# Patient Record
Sex: MALE | Race: WHITE | Employment: OTHER | URBAN - METROPOLITAN AREA
[De-identification: names, ages, dates, MRNs, and addresses within clinical notes are randomized per-mention and may not be internally consistent; named-entity substitution may affect disease eponyms.]

---

## 2017-11-22 PROBLEM — I21.19 ACUTE MI, INFEROLATERAL WALL, SUBSEQUENT EPISODE OF CARE (HCC): Status: ACTIVE | Noted: 2017-11-22

## 2017-11-22 PROBLEM — E78.5 DYSLIPIDEMIA: Status: ACTIVE | Noted: 2017-11-22

## 2017-11-22 PROBLEM — Z95.1 S/P CABG (CORONARY ARTERY BYPASS GRAFT): Status: ACTIVE | Noted: 2017-11-22

## 2017-11-22 PROBLEM — Z95.5 S/P CORONARY ARTERY STENT PLACEMENT: Status: ACTIVE | Noted: 2017-11-22

## 2019-02-05 ENCOUNTER — HOSPITAL ENCOUNTER (OUTPATIENT)
Dept: LAB | Age: 74
Discharge: HOME | End: 2019-02-05
Payer: MEDICARE

## 2019-02-05 DIAGNOSIS — I25.119 ATHEROSCLEROSIS OF NATIVE CORONARY ARTERY OF NATIVE HEART WITH ANGINA PECTORIS (HCC): ICD-10-CM

## 2019-02-05 LAB
ALBUMIN SERPL-MCNC: 3.7 G/DL (ref 3.2–4.6)
ALBUMIN/GLOB SERPL: 1.2 {RATIO}
ALP SERPL-CCNC: 65 U/L (ref 50–136)
ALT SERPL-CCNC: 34 U/L (ref 12–65)
AST SERPL-CCNC: 25 U/L (ref 15–37)
BILIRUB DIRECT SERPL-MCNC: 0.2 MG/DL
BILIRUB SERPL-MCNC: 0.7 MG/DL (ref 0.2–1.1)
CHOLEST SERPL-MCNC: 128 MG/DL
GLOBULIN SER CALC-MCNC: 3.2 G/DL
HDLC SERPL-MCNC: 50 MG/DL (ref 40–60)
HDLC SERPL: 2.6 {RATIO}
LDLC SERPL CALC-MCNC: 53.2 MG/DL
LIPID PROFILE,FLP: ABNORMAL
PROT SERPL-MCNC: 6.9 G/DL (ref 6.3–8.2)
TRIGL SERPL-MCNC: 124 MG/DL (ref 35–150)
VLDLC SERPL CALC-MCNC: 24.8 MG/DL (ref 6–23)

## 2019-02-05 PROCEDURE — 80076 HEPATIC FUNCTION PANEL: CPT

## 2019-02-05 PROCEDURE — 80061 LIPID PANEL: CPT

## 2019-02-05 PROCEDURE — 36415 COLL VENOUS BLD VENIPUNCTURE: CPT

## 2019-08-28 PROBLEM — R07.89 ATYPICAL CHEST PAIN: Status: ACTIVE | Noted: 2019-08-28

## 2019-08-28 PROBLEM — R06.09 DOE (DYSPNEA ON EXERTION): Status: ACTIVE | Noted: 2019-08-28

## 2022-03-18 PROBLEM — I21.19 ACUTE MI, INFEROLATERAL WALL, SUBSEQUENT EPISODE OF CARE (HCC): Status: ACTIVE | Noted: 2017-11-22

## 2022-03-18 PROBLEM — R07.89 ATYPICAL CHEST PAIN: Status: ACTIVE | Noted: 2019-08-28

## 2022-03-18 PROBLEM — R06.09 DOE (DYSPNEA ON EXERTION): Status: ACTIVE | Noted: 2019-08-28

## 2022-03-19 PROBLEM — Z95.5 S/P CORONARY ARTERY STENT PLACEMENT: Status: ACTIVE | Noted: 2017-11-22

## 2022-03-19 PROBLEM — Z95.1 S/P CABG (CORONARY ARTERY BYPASS GRAFT): Status: ACTIVE | Noted: 2017-11-22

## 2022-03-19 PROBLEM — E78.5 DYSLIPIDEMIA: Status: ACTIVE | Noted: 2017-11-22

## 2022-11-30 ENCOUNTER — OFFICE VISIT (OUTPATIENT)
Dept: INTERNAL MEDICINE CLINIC | Facility: CLINIC | Age: 77
End: 2022-11-30
Payer: MEDICARE

## 2022-11-30 VITALS
SYSTOLIC BLOOD PRESSURE: 146 MMHG | HEART RATE: 67 BPM | OXYGEN SATURATION: 97 % | HEIGHT: 72 IN | TEMPERATURE: 98.1 F | BODY MASS INDEX: 29.66 KG/M2 | WEIGHT: 219 LBS | DIASTOLIC BLOOD PRESSURE: 68 MMHG

## 2022-11-30 DIAGNOSIS — F03.B2 MODERATE DEMENTIA WITH PSYCHOTIC DISTURBANCE, UNSPECIFIED DEMENTIA TYPE: ICD-10-CM

## 2022-11-30 DIAGNOSIS — R25.1 TREMOR: ICD-10-CM

## 2022-11-30 DIAGNOSIS — Z95.1 S/P CABG (CORONARY ARTERY BYPASS GRAFT): ICD-10-CM

## 2022-11-30 DIAGNOSIS — I10 ESSENTIAL HYPERTENSION, BENIGN: ICD-10-CM

## 2022-11-30 DIAGNOSIS — N40.1 BENIGN PROSTATIC HYPERPLASIA WITH LOWER URINARY TRACT SYMPTOMS, SYMPTOM DETAILS UNSPECIFIED: ICD-10-CM

## 2022-11-30 DIAGNOSIS — R68.89 OTHER GENERAL SYMPTOMS AND SIGNS: ICD-10-CM

## 2022-11-30 DIAGNOSIS — Z91.81 AT HIGH RISK FOR FALLS: ICD-10-CM

## 2022-11-30 DIAGNOSIS — I50.22 CHRONIC SYSTOLIC (CONGESTIVE) HEART FAILURE (HCC): ICD-10-CM

## 2022-11-30 DIAGNOSIS — G62.89 OTHER POLYNEUROPATHY: ICD-10-CM

## 2022-11-30 DIAGNOSIS — R53.82 CHRONIC FATIGUE: ICD-10-CM

## 2022-11-30 DIAGNOSIS — R73.03 PREDIABETES: ICD-10-CM

## 2022-11-30 DIAGNOSIS — I50.22 CHRONIC SYSTOLIC HF (HEART FAILURE) (HCC): ICD-10-CM

## 2022-11-30 DIAGNOSIS — E78.5 DYSLIPIDEMIA: ICD-10-CM

## 2022-11-30 DIAGNOSIS — G60.9 HEREDITARY AND IDIOPATHIC PERIPHERAL NEUROPATHY: Primary | ICD-10-CM

## 2022-11-30 PROBLEM — R06.09 DOE (DYSPNEA ON EXERTION): Status: RESOLVED | Noted: 2019-08-28 | Resolved: 2022-11-30

## 2022-11-30 PROBLEM — R07.89 ATYPICAL CHEST PAIN: Status: RESOLVED | Noted: 2019-08-28 | Resolved: 2022-11-30

## 2022-11-30 PROBLEM — Z95.5 S/P CORONARY ARTERY STENT PLACEMENT: Status: RESOLVED | Noted: 2017-11-22 | Resolved: 2022-11-30

## 2022-11-30 LAB
ALBUMIN SERPL-MCNC: 4.2 G/DL (ref 3.2–4.6)
ALBUMIN/GLOB SERPL: 1.6 {RATIO} (ref 0.4–1.6)
ALP SERPL-CCNC: 69 U/L (ref 50–136)
ALT SERPL-CCNC: 33 U/L (ref 12–65)
ANION GAP SERPL CALC-SCNC: 6 MMOL/L (ref 2–11)
AST SERPL-CCNC: 12 U/L (ref 15–37)
BASOPHILS # BLD: 0 K/UL (ref 0–0.2)
BASOPHILS NFR BLD: 0 % (ref 0–2)
BILIRUB SERPL-MCNC: 0.7 MG/DL (ref 0.2–1.1)
BUN SERPL-MCNC: 12 MG/DL (ref 8–23)
CALCIUM SERPL-MCNC: 10 MG/DL (ref 8.3–10.4)
CHLORIDE SERPL-SCNC: 106 MMOL/L (ref 101–110)
CHOLEST SERPL-MCNC: 116 MG/DL
CO2 SERPL-SCNC: 26 MMOL/L (ref 21–32)
CREAT SERPL-MCNC: 1 MG/DL (ref 0.8–1.5)
DIFFERENTIAL METHOD BLD: ABNORMAL
EOSINOPHIL # BLD: 0.1 K/UL (ref 0–0.8)
EOSINOPHIL NFR BLD: 1 % (ref 0.5–7.8)
ERYTHROCYTE [DISTWIDTH] IN BLOOD BY AUTOMATED COUNT: 12.2 % (ref 11.9–14.6)
EST. AVERAGE GLUCOSE BLD GHB EST-MCNC: 166 MG/DL
GLOBULIN SER CALC-MCNC: 2.6 G/DL (ref 2.8–4.5)
GLUCOSE SERPL-MCNC: 148 MG/DL (ref 65–100)
HBA1C MFR BLD: 7.4 % (ref 4.8–5.6)
HCT VFR BLD AUTO: 48.5 % (ref 41.1–50.3)
HDLC SERPL-MCNC: 54 MG/DL (ref 40–60)
HDLC SERPL: 2.1 {RATIO}
HGB BLD-MCNC: 15.5 G/DL (ref 13.6–17.2)
IMM GRANULOCYTES # BLD AUTO: 0 K/UL (ref 0–0.5)
IMM GRANULOCYTES NFR BLD AUTO: 0 % (ref 0–5)
LDLC SERPL CALC-MCNC: 42.8 MG/DL
LYMPHOCYTES # BLD: 1.9 K/UL (ref 0.5–4.6)
LYMPHOCYTES NFR BLD: 22 % (ref 13–44)
MCH RBC QN AUTO: 33.3 PG (ref 26.1–32.9)
MCHC RBC AUTO-ENTMCNC: 32 G/DL (ref 31.4–35)
MCV RBC AUTO: 104.1 FL (ref 82–102)
MONOCYTES # BLD: 0.9 K/UL (ref 0.1–1.3)
MONOCYTES NFR BLD: 10 % (ref 4–12)
NEUTS SEG # BLD: 5.7 K/UL (ref 1.7–8.2)
NEUTS SEG NFR BLD: 67 % (ref 43–78)
NRBC # BLD: 0 K/UL (ref 0–0.2)
PLATELET # BLD AUTO: 235 K/UL (ref 150–450)
PMV BLD AUTO: 11.6 FL (ref 9.4–12.3)
POTASSIUM SERPL-SCNC: 4.4 MMOL/L (ref 3.5–5.1)
PROT SERPL-MCNC: 6.8 G/DL (ref 6.3–8.2)
RBC # BLD AUTO: 4.66 M/UL (ref 4.23–5.6)
SODIUM SERPL-SCNC: 138 MMOL/L (ref 133–143)
TRIGL SERPL-MCNC: 96 MG/DL (ref 35–150)
TSH, 3RD GENERATION: 1.65 UIU/ML (ref 0.36–3.74)
VIT B12 SERPL-MCNC: 277 PG/ML (ref 193–986)
VLDLC SERPL CALC-MCNC: 19.2 MG/DL (ref 6–23)
WBC # BLD AUTO: 8.7 K/UL (ref 4.3–11.1)

## 2022-11-30 PROCEDURE — G8484 FLU IMMUNIZE NO ADMIN: HCPCS | Performed by: INTERNAL MEDICINE

## 2022-11-30 PROCEDURE — 1036F TOBACCO NON-USER: CPT | Performed by: INTERNAL MEDICINE

## 2022-11-30 PROCEDURE — 1123F ACP DISCUSS/DSCN MKR DOCD: CPT | Performed by: INTERNAL MEDICINE

## 2022-11-30 PROCEDURE — 3078F DIAST BP <80 MM HG: CPT | Performed by: INTERNAL MEDICINE

## 2022-11-30 PROCEDURE — 99215 OFFICE O/P EST HI 40 MIN: CPT | Performed by: INTERNAL MEDICINE

## 2022-11-30 PROCEDURE — G8427 DOCREV CUR MEDS BY ELIG CLIN: HCPCS | Performed by: INTERNAL MEDICINE

## 2022-11-30 PROCEDURE — G8417 CALC BMI ABV UP PARAM F/U: HCPCS | Performed by: INTERNAL MEDICINE

## 2022-11-30 PROCEDURE — 3075F SYST BP GE 130 - 139MM HG: CPT | Performed by: INTERNAL MEDICINE

## 2022-11-30 RX ORDER — ZINC GLUCONATE 50 MG
50 TABLET ORAL DAILY
COMMUNITY

## 2022-11-30 RX ORDER — MIRTAZAPINE 7.5 MG/1
7.5 TABLET, FILM COATED ORAL NIGHTLY
Qty: 30 TABLET | Refills: 5 | Status: SHIPPED | OUTPATIENT
Start: 2022-11-30

## 2022-11-30 RX ORDER — ASCORBIC ACID 500 MG
1000 TABLET ORAL DAILY
COMMUNITY

## 2022-11-30 SDOH — ECONOMIC STABILITY: FOOD INSECURITY: WITHIN THE PAST 12 MONTHS, YOU WORRIED THAT YOUR FOOD WOULD RUN OUT BEFORE YOU GOT MONEY TO BUY MORE.: NEVER TRUE

## 2022-11-30 SDOH — ECONOMIC STABILITY: FOOD INSECURITY: WITHIN THE PAST 12 MONTHS, THE FOOD YOU BOUGHT JUST DIDN'T LAST AND YOU DIDN'T HAVE MONEY TO GET MORE.: NEVER TRUE

## 2022-11-30 ASSESSMENT — PATIENT HEALTH QUESTIONNAIRE - PHQ9
SUM OF ALL RESPONSES TO PHQ QUESTIONS 1-9: 0
SUM OF ALL RESPONSES TO PHQ9 QUESTIONS 1 & 2: 0
SUM OF ALL RESPONSES TO PHQ QUESTIONS 1-9: 0
2. FEELING DOWN, DEPRESSED OR HOPELESS: 0
1. LITTLE INTEREST OR PLEASURE IN DOING THINGS: 0

## 2022-11-30 ASSESSMENT — SOCIAL DETERMINANTS OF HEALTH (SDOH): HOW HARD IS IT FOR YOU TO PAY FOR THE VERY BASICS LIKE FOOD, HOUSING, MEDICAL CARE, AND HEATING?: NOT HARD AT ALL

## 2022-11-30 NOTE — PROGRESS NOTES
11/30/2022 1:26 PM  Location:[facility] [address] [facility]  [place]  Patient #:  [id number]  YOB: 1945          YOUR LAST HEMOGLOBIN A1CS:   No results found for: HBA1C, [id number]    YOUR LAST LIPID PROFILE:   Lab Results   Component Value Date/Time    CHOL 154 04/12/2021 11:45 AM    HDL 51 04/12/2021 11:45 AM    VLDL 21 04/12/2021 11:45 AM         Lab Results   Component Value Date/Time    GFRAA 85 04/12/2021 11:45 AM    BUN 15 04/12/2021 11:45 AM     04/12/2021 11:45 AM    K 4.6 04/12/2021 11:45 AM     04/12/2021 11:45 AM    CO2 18 04/12/2021 11:45 AM           History of Present Illness     Chief Complaint   Patient presents with    Hallucinations     Started June of 2021 and is still happening multiple times a day. Includes visual, auditory and olfactory hallucinations. Constipation     Struggles with constipation but also diarrhea at times too. Dementia     Having cognitive issues including short term memory loss and loss of autonomy. Insomnia     Restless and staying up all day and night. Pt's daughter says it is contributing to the problem. Tremors     Shaking in hands that prevents him from being able to eat or drink. Patient is brought in by his daughter today. The patient has not been seen in the office in 18 months. He has resisted coming to be evaluated for any reason. He is also not been followed by cardiology for his history of coronary artery disease over the past week this patient has had progressive worsening of his pre-existing symptoms of memory loss. Over the past week he has had what the family describes as chills and shaking worse than usual had worsening hallucinations including seeing animals that are not existent in the yard auditory described as screaming and sensations in his extremities as well. Provided a multipage record of his problems over the past 7 years.   This week he has been unable to even feed himself. Over the past year his mobility is worsened he is sleeping very little which is causing a great deal of distress with the family who are caring for him. He is becoming more combative in conversations. Weight loss has been noted over the past few years with his decreased appetite. Has tremor of the right hand is worsened. Continue to be on his present medications as listed. He especially is having increased tremor of the right hand and shuffling with his gait. He has no prior history of any drug or alcohol use. He has a long history of what sounds like IBS going back and forth between constipation and diarrhea. During this past week they have noticed that he is diaphoretic at times. He does have some cough and congestion with yellowish sputum noted at times. Urinary symptoms are also an issue with frequency.   Please refer to the extensive timeline of events over the past 7 years submitted by the daughter    Mr. Willard Hill is a 68 y.o. male  who presents for office visit      No Known Allergies  Past Medical History:   Diagnosis Date    Benign neoplasm of colon     Body mass index 32.0-32.9, adult 4/15/2015    Coronary atherosclerosis of unspecified type of vessel, native or graft 4/15/2015    Cough     Encounter for long-term (current) use of other medications     Essential hypertension, benign 4/15/2015    Impotence of organic origin     Inguinal hernia without mention of obstruction or gangrene, bilateral, (not specified as recurrent)     Injury to other specified cranial nerves     Malignant melanoma of skin of other and unspecified parts of face     S/P MOH's    Onychomycosis     Other abnormal glucose 4/15/2015    Pre-Diabetes    Palpitations     Plantar fascial fibromatosis     S/P coronary artery stent placement 11/22/2017    Sciatica     Unspecified hereditary and idiopathic peripheral neuropathy 4/15/2015     Social History     Socioeconomic History    Marital status:      Spouse name: None    Number of children: None    Years of education: None    Highest education level: None   Tobacco Use    Smoking status: Never    Smokeless tobacco: Never   Substance and Sexual Activity    Alcohol use: No    Drug use: No     Social Determinants of Health     Financial Resource Strain: Low Risk     Difficulty of Paying Living Expenses: Not hard at all   Food Insecurity: No Food Insecurity    Worried About Running Out of Food in the Last Year: Never true    Ran Out of Food in the Last Year: Never true     Past Surgical History:   Procedure Laterality Date    ANGIOPLASTY  05/22/2007    Coronary    CHOLECYSTECTOMY  09/16/2009    [facility]    CORONARY ARTERY BYPASS GRAFT  05/18/2006    VASECTOMY  05/19/1976     Current Outpatient Medications   Medication Sig Dispense Refill    vitamin C (ASCORBIC ACID) 500 MG tablet Take 1,000 mg by mouth daily      Cholecalciferol (VITAMIN D3) 125 MCG (5000 UT) TABS Take by mouth      zinc 50 MG TABS tablet Take 50 mg by mouth daily      Elderberry 500 MG CAPS Take 1,250 mg by mouth      OIL OF OREGANO PO Take 150 mg by mouth      Coenzyme Q10 (CO Q 10) 100 MG CAPS Take by mouth      Fexofenadine HCl (MUCINEX ALLERGY PO) Take by mouth      Chlorpheniramine Maleate (CHLOR-TABLETS PO) Take by mouth      mirtazapine (REMERON) 7.5 MG tablet Take 1 tablet by mouth nightly 30 tablet 5    aspirin 81 MG EC tablet Take 81 mg by mouth daily      carvedilol (COREG) 6.25 MG tablet TAKE 1 TABLET BY MOUTH TWO TIMES DAILY (WITH MEALS) FOR HIGH BLOOD PRESSURE      lisinopril (PRINIVIL;ZESTRIL) 10 MG tablet TAKE 1 TABLET TWICE DAILY FOR HIGH BLOOD PRESSURE      simvastatin (ZOCOR) 10 MG tablet TAKE 1 TABLET EVERY NIGHT      nitroGLYCERIN (NITROSTAT) 0.4 MG SL tablet Place 0.4 mg under the tongue (Patient not taking: Reported on 11/30/2022)       No current facility-administered medications for this visit.      Health Maintenance   Topic Date Due    COVID-19 Vaccine (1) Never done    Hepatitis C screen  Never done    Shingles vaccine (2 of 3) 11/24/2014    Lipids  04/12/2022    Depression Screen  04/12/2022    DTaP/Tdap/Td vaccine (2 - Td or Tdap) 05/03/2022    Annual Wellness Visit (AWV)  05/23/2022    Flu vaccine (1) 08/01/2022    Pneumococcal 65+ years Vaccine  Completed    Hepatitis A vaccine  Aged Out    Hib vaccine  Aged Out    Meningococcal (ACWY) vaccine  Aged Out     Family History   Problem Relation Age of Onset    Cancer Father         Throat    Heart Attack Father         Before age 61    Hypertension Father     Colon Cancer Brother     Glaucoma Maternal Grandmother     Hypertension Mother     Diabetes Mother     Heart Disease Father              Review of Systems  Review of Systems    BP (!) 146/68 (Site: Left Upper Arm, Position: Sitting, Cuff Size: Medium Adult)   Pulse 67   Temp 98.1 °F (36.7 °C) (Temporal)   Ht 6' (1.829 m)   Wt 219 lb (99.3 kg)   SpO2 97%   BMI 29.70 kg/m²       Physical Exam    Physical Exam  Constitutional:       General: He is not in acute distress. Appearance: He is not ill-appearing. Comments: Weakened in appearance with obvious slow rhythmic right hand and forearm tremor   HENT:      Head: Normocephalic and atraumatic. Right Ear: Tympanic membrane and ear canal normal.      Left Ear: Tympanic membrane and ear canal normal.      Nose: Nose normal.      Mouth/Throat:      Mouth: Mucous membranes are dry. Pharynx: Oropharynx is clear. Eyes:      Extraocular Movements: Extraocular movements intact. Conjunctiva/sclera: Conjunctivae normal.      Pupils: Pupils are equal, round, and reactive to light. Cardiovascular:      Rate and Rhythm: Normal rate and regular rhythm. Pulses: Normal pulses. Heart sounds: Normal heart sounds. Pulmonary:      Effort: Pulmonary effort is normal.      Breath sounds: Normal breath sounds.    Abdominal:      General: Abdomen is flat. Bowel sounds are normal. There is no distension. Palpations: Abdomen is soft. There is no mass. Tenderness: There is no abdominal tenderness. There is no guarding or rebound. Hernia: No hernia is present. Musculoskeletal:         General: Normal range of motion. Legs:       Comments: Bilateral venous stasis changes from knees down   Skin:     General: Skin is warm. Neurological:      General: No focal deficit present. Mental Status: He is alert and oriented to person, place, and time. Cranial Nerves: No cranial nerve deficit, dysarthria or facial asymmetry. Sensory: Sensory deficit present. Motor: Weakness present. Gait: Gait abnormal.      Deep Tendon Reflexes:      Reflex Scores:       Bicep reflexes are 0 on the right side and 0 on the left side. Patellar reflexes are 0 on the right side and 0 on the left side. Comments: Slow movement no obvious rigidity  Absent sensation to light touch and vibration in both feet   Psychiatric:         Mood and Affect: Mood normal.         Behavior: Behavior normal.         Thought Content: Thought content normal.         Judgment: Judgment normal.       Assessment & Plan    Encounter Diagnoses   Name Primary?     Hereditary and idiopathic peripheral neuropathy Yes    Tremor     At high risk for falls     Chronic systolic (congestive) heart failure (HCC)     Chronic fatigue     Other polyneuropathy     Other general symptoms and signs     Essential hypertension, benign     Prediabetes     S/P CABG (coronary artery bypass graft)     Benign prostatic hyperplasia with lower urinary tract symptoms, symptom details unspecified     Chronic systolic HF (heart failure) (HCC)     Dyslipidemia     Moderate dementia with psychotic disturbance, unspecified dementia type        Current Outpatient Medications   Medication Sig Dispense Refill    vitamin C (ASCORBIC ACID) 500 MG tablet Take 1,000 mg by mouth daily Cholecalciferol (VITAMIN D3) 125 MCG (5000 UT) TABS Take by mouth      zinc 50 MG TABS tablet Take 50 mg by mouth daily      Elderberry 500 MG CAPS Take 1,250 mg by mouth      OIL OF OREGANO PO Take 150 mg by mouth      Coenzyme Q10 (CO Q 10) 100 MG CAPS Take by mouth      Fexofenadine HCl (MUCINEX ALLERGY PO) Take by mouth      Chlorpheniramine Maleate (CHLOR-TABLETS PO) Take by mouth      mirtazapine (REMERON) 7.5 MG tablet Take 1 tablet by mouth nightly 30 tablet 5    aspirin 81 MG EC tablet Take 81 mg by mouth daily      carvedilol (COREG) 6.25 MG tablet TAKE 1 TABLET BY MOUTH TWO TIMES DAILY (WITH MEALS) FOR HIGH BLOOD PRESSURE      lisinopril (PRINIVIL;ZESTRIL) 10 MG tablet TAKE 1 TABLET TWICE DAILY FOR HIGH BLOOD PRESSURE      simvastatin (ZOCOR) 10 MG tablet TAKE 1 TABLET EVERY NIGHT      nitroGLYCERIN (NITROSTAT) 0.4 MG SL tablet Place 0.4 mg under the tongue (Patient not taking: Reported on 11/30/2022)       No current facility-administered medications for this visit. Orders Placed This Encounter   Procedures    Culture, Urine     Standing Status:   Future     Standing Expiration Date:   11/30/2023     Order Specific Question:   Specify (ex-cath, midstream, cysto, etc)?      Answer:   midstream    Comprehensive Metabolic Panel     Standing Status:   Future     Number of Occurrences:   1     Standing Expiration Date:   11/30/2023    CBC with Auto Differential     Standing Status:   Future     Number of Occurrences:   1     Standing Expiration Date:   11/30/2023    Lipid Panel     Standing Status:   Future     Number of Occurrences:   1     Standing Expiration Date:   11/30/2023    PSA, Diagnostic     Standing Status:   Future     Number of Occurrences:   1     Standing Expiration Date:   11/30/2023    Vitamin B12     Standing Status:   Future     Number of Occurrences:   1     Standing Expiration Date:   11/30/2023    TSH     Standing Status:   Future     Number of Occurrences:   1     Standing Expiration Date:   11/30/2023    Hemoglobin A1C     Standing Status:   Future     Number of Occurrences:   1     Standing Expiration Date:   11/30/2023    Urinalysis     Standing Status:   Future     Standing Expiration Date:   12/30/2022       There are no discontinued medications. 1. Tremor  Mostly in the right hand rhythmic consistent with a Parkinson's-like tremor. This is in association with his slight masked appearance and short gait. 2. Hereditary and idiopathic peripheral neuropathy  Noted for many years unchanged. No obvious etiology noted in the old records    3. At high risk for falls       4. Chronic systolic (congestive) heart failure (HCC)  Seems compensated on exam    5. Chronic fatigue     - Comprehensive Metabolic Panel; Future  - CBC with Auto Differential; Future  - TSH; Future  - TSH  - CBC with Auto Differential  - Comprehensive Metabolic Panel    6. Other polyneuropathy       7. Other general symptoms and signs     - Vitamin B12; Future  - Vitamin B12    8. Essential hypertension, benign  Blood pressure is relatively well controlled  - Lipid Panel; Future  - Lipid Panel    9. Prediabetes  Noted in the past  - Hemoglobin A1C; Future  - Hemoglobin A1C    10. S/P CABG (coronary artery bypass graft)       11. Benign prostatic hyperplasia with lower urinary tract symptoms, symptom details unspecified  Symptoms seem consistent with this he cannot provide a urinalysis today for patient of UTI or otherwise  - PSA, Diagnostic; Future  - Urinalysis; Future  - Culture, Urine; Future  - PSA, Diagnostic    12. Chronic systolic HF (heart failure) (HCC)  Is stable followed by cardiology he is overdue for a visit    13. Dyslipidemia  On a statin    14. Moderate dementia with psychotic disturbance, unspecified dementia type  Today he is oriented his symptoms seem to be worsened by late afternoon associated with agitation.   The daughter is done extensive research on his symptoms and feels that he is suffering from Lewy bodies dementia. I had a lengthy conversation with her in regards to a work-up for dementia and this will be initiated today I do feel he should have a follow-up appointment with neurology as well to help differentiate and parkinsonism with dementia or possible Lewy bodies dementia      No follow-up provider specified. Spent approximately 1 hour with the family reviewing records and counseling and otherwise    Cristela De Luna MD  On the basis of positive falls risk screening, assessment and plan is as follows: home safety tips provided.

## 2022-12-01 DIAGNOSIS — N40.1 BENIGN PROSTATIC HYPERPLASIA WITH LOWER URINARY TRACT SYMPTOMS, SYMPTOM DETAILS UNSPECIFIED: ICD-10-CM

## 2022-12-01 LAB
APPEARANCE UR: CLEAR
BILIRUB UR QL: NEGATIVE
COLOR UR: NORMAL
GLUCOSE UR STRIP.AUTO-MCNC: NEGATIVE MG/DL
HGB UR QL STRIP: NEGATIVE
KETONES UR QL STRIP.AUTO: NEGATIVE MG/DL
LEUKOCYTE ESTERASE UR QL STRIP.AUTO: NEGATIVE
NITRITE UR QL STRIP.AUTO: NEGATIVE
PH UR STRIP: 5 [PH] (ref 5–9)
PROT UR STRIP-MCNC: NEGATIVE MG/DL
PSA SERPL-MCNC: 13.2 NG/ML
SP GR UR REFRACTOMETRY: 1.01 (ref 1–1.02)
UROBILINOGEN UR QL STRIP.AUTO: 0.2 EU/DL (ref 0.2–1)

## 2022-12-04 LAB
BACTERIA SPEC CULT: NORMAL
SERVICE CMNT-IMP: NORMAL

## 2022-12-05 ENCOUNTER — TELEPHONE (OUTPATIENT)
Dept: INTERNAL MEDICINE CLINIC | Facility: CLINIC | Age: 77
End: 2022-12-05

## 2022-12-05 DIAGNOSIS — N40.1 BENIGN PROSTATIC HYPERPLASIA WITH URINARY FREQUENCY: Primary | ICD-10-CM

## 2022-12-05 DIAGNOSIS — R35.0 BENIGN PROSTATIC HYPERPLASIA WITH URINARY FREQUENCY: Primary | ICD-10-CM

## 2022-12-05 DIAGNOSIS — G20 PARKINSON DISEASE (HCC): Primary | ICD-10-CM

## 2022-12-05 DIAGNOSIS — R97.20 ELEVATED PSA: ICD-10-CM

## 2022-12-05 NOTE — TELEPHONE ENCOUNTER
Spoke to the daughter, will start Sinemet at low dose, make appt to see urology due to BPH symptoms and elevated PSA. He is improving on mirtazapine hs for sleep.  The will start oral B12 as well cms

## 2022-12-21 ENCOUNTER — OFFICE VISIT (OUTPATIENT)
Dept: UROLOGY | Age: 77
End: 2022-12-21
Payer: MEDICARE

## 2022-12-21 DIAGNOSIS — R97.20 ELEVATED PSA: Primary | ICD-10-CM

## 2022-12-21 DIAGNOSIS — N40.1 BPH WITH OBSTRUCTION/LOWER URINARY TRACT SYMPTOMS: ICD-10-CM

## 2022-12-21 DIAGNOSIS — N13.8 BPH WITH OBSTRUCTION/LOWER URINARY TRACT SYMPTOMS: ICD-10-CM

## 2022-12-21 LAB
BILIRUBIN, URINE, POC: NEGATIVE
BLOOD URINE, POC: NORMAL
GLUCOSE URINE, POC: NEGATIVE
KETONES, URINE, POC: NEGATIVE
LEUKOCYTE ESTERASE, URINE, POC: NEGATIVE
NITRITE, URINE, POC: NEGATIVE
PH, URINE, POC: 5.5 (ref 4.6–8)
PROTEIN,URINE, POC: NEGATIVE
SPECIFIC GRAVITY, URINE, POC: 1.02 (ref 1–1.03)
URINALYSIS CLARITY, POC: NORMAL
URINALYSIS COLOR, POC: NORMAL
UROBILINOGEN, POC: NORMAL

## 2022-12-21 PROCEDURE — G8484 FLU IMMUNIZE NO ADMIN: HCPCS | Performed by: NURSE PRACTITIONER

## 2022-12-21 PROCEDURE — 99204 OFFICE O/P NEW MOD 45 MIN: CPT | Performed by: NURSE PRACTITIONER

## 2022-12-21 PROCEDURE — 1123F ACP DISCUSS/DSCN MKR DOCD: CPT | Performed by: NURSE PRACTITIONER

## 2022-12-21 PROCEDURE — G8427 DOCREV CUR MEDS BY ELIG CLIN: HCPCS | Performed by: NURSE PRACTITIONER

## 2022-12-21 PROCEDURE — 81003 URINALYSIS AUTO W/O SCOPE: CPT | Performed by: NURSE PRACTITIONER

## 2022-12-21 PROCEDURE — G8417 CALC BMI ABV UP PARAM F/U: HCPCS | Performed by: NURSE PRACTITIONER

## 2022-12-21 PROCEDURE — 1036F TOBACCO NON-USER: CPT | Performed by: NURSE PRACTITIONER

## 2022-12-21 ASSESSMENT — ENCOUNTER SYMPTOMS
RESPIRATORY NEGATIVE: 1
EYES NEGATIVE: 1

## 2022-12-21 NOTE — PROGRESS NOTES
[facility]  [address] [facility]  Nisha Sandoval [address] [facility], [address] [facility]  [phone]          Mike Triplett  : 1945    Chief Complaint   Patient presents with    New Patient     Elevated PSA          HPI     Mike Triplett is a 68 y.o. male referred for elevated PSA. He reports NO h/o BPH or prostate CA. No family h/o urological CA. He has had melanoma and basal cell skin CA. He reports nocturia x 2/night, min PVD, and some urgency. Overall, he has no concern w his voiding. No h/o gross hematuria. Previous vasectomy. Non-smoker. He is accompanied by his wife. Recently started have dementia and hand tremor. ?jazmin. Has been referred to neurology for evaluation.          Past Medical History:   Diagnosis Date    Benign neoplasm of colon     Body mass index 32.0-32.9, adult 4/15/2015    Coronary atherosclerosis of unspecified type of vessel, native or graft 4/15/2015    Cough     Encounter for long-term (current) use of other medications     Essential hypertension, benign 4/15/2015    Impotence of organic origin     Inguinal hernia without mention of obstruction or gangrene, bilateral, (not specified as recurrent)     Injury to other specified cranial nerves     Malignant melanoma of skin of other and unspecified parts of face     S/P MOH's    Onychomycosis     Other abnormal glucose 4/15/2015    Pre-Diabetes    Palpitations     Plantar fascial fibromatosis     S/P coronary artery stent placement 2017    Sciatica     Unspecified hereditary and idiopathic peripheral neuropathy 4/15/2015     Past Surgical History:   Procedure Laterality Date    ANGIOPLASTY  2007    Coronary    CHOLECYSTECTOMY  2009    CORONARY ANGIOPLASTY WITH [address]    CORONARY ARTERY BYPASS GRAFT  2006    VASECTOMY  1976     Current Outpatient Medications   Medication Sig Dispense Refill    carbidopa-levodopa (SINEMET)  MG per tablet Take 1 tablet by mouth 3 times daily 90 tablet 3    vitamin C (ASCORBIC ACID) 500 MG tablet Take 1,000 mg by mouth daily      Cholecalciferol (VITAMIN D3) 125 MCG (5000 UT) TABS Take by mouth      zinc 50 MG TABS tablet Take 50 mg by mouth daily      Elderberry 500 MG CAPS Take 1,250 mg by mouth      OIL OF OREGANO PO Take 150 mg by mouth      Coenzyme Q10 (CO Q 10) 100 MG CAPS Take by mouth      Fexofenadine HCl (MUCINEX ALLERGY PO) Take by mouth      Chlorpheniramine Maleate (CHLOR-TABLETS PO) Take by mouth      mirtazapine (REMERON) 7.5 MG tablet Take 1 tablet by mouth nightly 30 tablet 5    aspirin 81 MG EC tablet Take 81 mg by mouth daily      carvedilol (COREG) 6.25 MG tablet TAKE 1 TABLET BY MOUTH TWO TIMES DAILY (WITH MEALS) FOR HIGH BLOOD PRESSURE      lisinopril (PRINIVIL;ZESTRIL) 10 MG tablet TAKE 1 TABLET TWICE DAILY FOR HIGH BLOOD PRESSURE      simvastatin (ZOCOR) 10 MG tablet TAKE 1 TABLET EVERY NIGHT      nitroGLYCERIN (NITROSTAT) 0.4 MG SL tablet Place 0.4 mg under the tongue (Patient not taking: No sig reported)       No current facility-administered medications for this visit.      No Known Allergies  Social History     Socioeconomic History    Marital status:      Spouse name: Not on file    Number of children: Not on file    Years of education: Not on file    Highest education level: Not on file   Occupational History    Not on file   Tobacco Use    Smoking status: Never    Smokeless tobacco: Never   Substance and Sexual Activity    Alcohol use: No    Drug use: No    Sexual activity: Not on file   Other Topics Concern    Not on file   Social History Narrative    Not on file     Social Determinants of Health     Financial Resource Strain: Low Risk     Difficulty of Paying Living Expenses: Not hard at all   Food Insecurity: No Food Insecurity    Worried About Running Out of Food in the Last Year: Never true    Ran Out of Food in the Last Year: Never true   Transportation Needs: Not on file   Physical Activity: Not on file   Stress: Not on file   Social Connections: Not on file   Intimate Partner Violence: Not on file   Housing Stability: Not on file     Family History   Problem Relation Age of Onset    Cancer Father         Throat    Heart Attack Father         Before age 61    Hypertension Father     Colon Cancer Brother     Glaucoma Maternal Grandmother     Hypertension Mother     Diabetes Mother     Heart Disease Father        Review of Systems  Skin: Negative skin ROS  Eyes: Eyes negative  ENT: HENT negative  Respiratory: Respiratory negative  Cardiovascular: Positive for hypertension, leg pain and leg swelling. Genitourinary: Positive for nocturia, slower stream, urgency, leakage w/ urge and frequent urination. Musculoskeletal: Positive for muscle weakness. Neurological: Positive for focal weakness, numbness and tremors. Psychological: Neg psych ROS  Endocrine: Positive for cold intolerance, fatigue and heat intolerance.     Urinalysis  UA - Dipstick  Results for orders placed or performed in visit on 12/21/22   AMB POC URINALYSIS DIP STICK AUTO W/O MICRO   Result Value Ref Range    Color (UA POC)      Clarity (UA POC)      Glucose, Urine, POC Negative Negative    Bilirubin, Urine, POC Negative Negative    KETONES, Urine, POC Negative Negative    Specific Gravity, Urine, POC 1.025 1.001 - 1.035    Blood (UA POC) Trace Negative    pH, Urine, POC 5.5 4.6 - 8.0    Protein, Urine, POC Negative Negative    Urobilinogen, POC 0.2 mg/dL     Nitrite, Urine, POC Negative Negative    Leukocyte Esterase, Urine, POC Negative Negative       UA - Micro  WBC - 0  RBC - 0  Bacteria - 0  Epith - 0    PHYSICAL EXAM    General appearance - well appearing and in no distress  Mental status - alert, oriented to person, place, and time  Neck - supple, no significant adenopathy  Chest/Lung-  Quiet, even and easy respiratory effort without use of accessory muscles  Rectal/EMANUEL- anodular prostate   Skin - normal coloration and turgor, no rashes      Assessment and Plan    ICD-10-CM    1. Elevated PSA  R97.20 AMB POC URINALYSIS DIP STICK AUTO W/O MICRO     PSA, Diagnostic     PSA, Diagnostic      2. BPH with obstruction/lower urinary tract symptoms  N40.1     N13.8           Elevated PS- We first discussed the physiology of psa. We also discussed potential causes of elevated psa including prostate cancer, inflammation, infection, BPH, and idiopathic elevations. Treatment options including rechecking psa vs. MRI prostate vs. going forward with prostate biopsy were discussed. Due to normal EMANUEL, will recheck PSA today. Follow up pending this. BPH/LUTS- urine normal. LUTS consistent w BPH. No bother to patient. No tx indicated at this time. HOLD on rx today. Advised to call sooner if needed. Lori Waters, DEMETRIOP, APRN - CNP  Dr. Tamica Irwin is supervising physician today and he approves plan of care.

## 2022-12-22 LAB — PSA SERPL-MCNC: 9 NG/ML

## 2023-01-04 RX ORDER — LISINOPRIL 10 MG/1
TABLET ORAL
Qty: 180 TABLET | Refills: 0 | Status: SHIPPED | OUTPATIENT
Start: 2023-01-04

## 2023-01-04 RX ORDER — CARVEDILOL 6.25 MG/1
TABLET ORAL
Qty: 180 TABLET | Refills: 0 | Status: SHIPPED | OUTPATIENT
Start: 2023-01-04

## 2023-01-04 RX ORDER — SIMVASTATIN 10 MG
TABLET ORAL
Qty: 90 TABLET | Refills: 0 | Status: SHIPPED | OUTPATIENT
Start: 2023-01-04

## 2023-01-05 ASSESSMENT — PATIENT HEALTH QUESTIONNAIRE - PHQ9
SUM OF ALL RESPONSES TO PHQ QUESTIONS 1-9: 0
SUM OF ALL RESPONSES TO PHQ QUESTIONS 1-9: 0
2. FEELING DOWN, DEPRESSED OR HOPELESS: 0
SUM OF ALL RESPONSES TO PHQ QUESTIONS 1-9: 0
SUM OF ALL RESPONSES TO PHQ QUESTIONS 1-9: 0
SUM OF ALL RESPONSES TO PHQ9 QUESTIONS 1 & 2: 0
1. LITTLE INTEREST OR PLEASURE IN DOING THINGS: 0

## 2023-01-05 ASSESSMENT — LIFESTYLE VARIABLES
HOW MANY STANDARD DRINKS CONTAINING ALCOHOL DO YOU HAVE ON A TYPICAL DAY: PATIENT DOES NOT DRINK
HOW OFTEN DO YOU HAVE A DRINK CONTAINING ALCOHOL: NEVER

## 2023-01-06 ENCOUNTER — OFFICE VISIT (OUTPATIENT)
Dept: INTERNAL MEDICINE CLINIC | Facility: CLINIC | Age: 78
End: 2023-01-06

## 2023-01-06 VITALS
TEMPERATURE: 97.3 F | DIASTOLIC BLOOD PRESSURE: 69 MMHG | SYSTOLIC BLOOD PRESSURE: 143 MMHG | HEART RATE: 59 BPM | BODY MASS INDEX: 29.99 KG/M2 | WEIGHT: 221.4 LBS | RESPIRATION RATE: 16 BRPM | HEIGHT: 72 IN

## 2023-01-06 DIAGNOSIS — I50.22 CHRONIC SYSTOLIC HF (HEART FAILURE) (HCC): ICD-10-CM

## 2023-01-06 DIAGNOSIS — R41.3 MEMORY LOSS: ICD-10-CM

## 2023-01-06 DIAGNOSIS — G60.9 HEREDITARY AND IDIOPATHIC PERIPHERAL NEUROPATHY: ICD-10-CM

## 2023-01-06 DIAGNOSIS — Z00.00 MEDICARE ANNUAL WELLNESS VISIT, SUBSEQUENT: ICD-10-CM

## 2023-01-06 DIAGNOSIS — N40.1 BENIGN PROSTATIC HYPERPLASIA WITH URINARY FREQUENCY: ICD-10-CM

## 2023-01-06 DIAGNOSIS — R35.0 BENIGN PROSTATIC HYPERPLASIA WITH URINARY FREQUENCY: ICD-10-CM

## 2023-01-06 DIAGNOSIS — G62.89 OTHER POLYNEUROPATHY: ICD-10-CM

## 2023-01-06 DIAGNOSIS — G20 PARKINSON DISEASE (HCC): Primary | ICD-10-CM

## 2023-01-06 DIAGNOSIS — R73.03 PREDIABETES: ICD-10-CM

## 2023-01-06 DIAGNOSIS — F51.01 PRIMARY INSOMNIA: ICD-10-CM

## 2023-01-06 LAB — GLUCOSE, POC: 150 MG/DL

## 2023-01-06 RX ORDER — ALPHA LIPOIC ACID 300 MG
CAPSULE ORAL DAILY
COMMUNITY

## 2023-01-06 RX ORDER — MIRTAZAPINE 7.5 MG/1
7.5-15 TABLET, FILM COATED ORAL NIGHTLY
Qty: 180 TABLET | Refills: 3 | Status: SHIPPED | OUTPATIENT
Start: 2023-01-06

## 2023-01-06 RX ORDER — CARBIDOPA/LEVODOPA 25MG-250MG
1 TABLET ORAL 2 TIMES DAILY
Qty: 60 TABLET | Refills: 3 | Status: SHIPPED | OUTPATIENT
Start: 2023-01-06

## 2023-01-06 RX ORDER — CALCIUM/MAGNESIUM/ZINC 333-133 MG
TABLET ORAL DAILY
COMMUNITY

## 2023-01-06 NOTE — PROGRESS NOTES
1/6/2023 4:41 PM  Location:[facility] [address] [facility]  [place]  Patient #:  [id number]  YOB: 1945          YOUR LAST HEMOGLOBIN A1CS:   No results found for: HBA1C, [id number]    YOUR LAST LIPID PROFILE:   Lab Results   Component Value Date/Time    CHOL 116 11/30/2022 12:27 PM    HDL 54 11/30/2022 12:27 PM    VLDL 21 04/12/2021 11:45 AM         Lab Results   Component Value Date/Time    GFRAA 85 04/12/2021 11:45 AM    BUN 12 11/30/2022 12:27 PM     11/30/2022 12:27 PM    K 4.4 11/30/2022 12:27 PM     11/30/2022 12:27 PM    CO2 26 11/30/2022 12:27 PM           History of Present Illness     Chief Complaint   Patient presents with    Medicare AWV     Subsequent     Follow-up     4 week follow-up    Discuss Labs     Saw the urologist and the PSA has came down to 9 from a 13 and they will recheck again in March     Since patient's last visit he has had a remarkable improvement with his agitation and insomnia with mirtazapine. The daughter is noted no real improvement with his tremor or other symptoms with the low-dose Sinemet. He PSA was over 13 and he is recently seen urology and a repeat PSA was significantly lower down to 9 and they will follow this conservatively. His memory loss continues to be problematic. Blood sugars have been ranging in the 160-180 range and his hemoglobin A1c last visit was 7.4. He is on no treatment for diabetes.     Mr. Esperanza Sher is a 68 y.o. male  who presents for office visit      No Known Allergies  Past Medical History:   Diagnosis Date    Benign neoplasm of colon     Body mass index 32.0-32.9, adult 4/15/2015    Coronary atherosclerosis of unspecified type of vessel, native or graft 4/15/2015    Cough     Encounter for long-term (current) use of other medications     Essential hypertension, benign 4/15/2015    Impotence of organic origin     Inguinal hernia without mention of obstruction or gangrene, bilateral, (not specified as recurrent)     Injury to other specified cranial nerves     Malignant melanoma of skin of other and unspecified parts of face     S/P MOH's    Onychomycosis     Other abnormal glucose 4/15/2015    Pre-Diabetes    Palpitations     Plantar fascial fibromatosis     S/P coronary artery stent placement 11/22/2017    Sciatica     Unspecified hereditary and idiopathic peripheral neuropathy 4/15/2015     Social History     Socioeconomic History    Marital status:      Spouse name: None    Number of children: None    Years of education: None    Highest education level: None   Tobacco Use    Smoking status: Never    Smokeless tobacco: Never   Substance and Sexual Activity    Alcohol use: No    Drug use: No     Social Determinants of Health     Financial Resource Strain: Low Risk     Difficulty of Paying Living Expenses: Not hard at all   Food Insecurity: No Food Insecurity    Worried About Running Out of Food in the Last Year: Never true    Ran Out of Food in the Last Year: Never true   Physical Activity: Insufficiently Active    Days of Exercise per Week: 7 days    Minutes of Exercise per Session: 10 min     Past Surgical History:   Procedure Laterality Date    ANGIOPLASTY  05/22/2007    Coronary    CHOLECYSTECTOMY  09/16/2009    CORONARY ANGIOPLASTY WITH STENT PLACEMENT  1989    Formerly [facility], 1928–1983    CORONARY ARTERY BYPASS GRAFT  05/18/2006    VASECTOMY  05/19/1976     Current Outpatient Medications   Medication Sig Dispense Refill    Alpha-Lipoic Acid 300 MG CAPS Take by mouth daily      Echinacea 400 MG CAPS Take by mouth daily      mirtazapine (REMERON) 7.5 MG tablet Take 1-2 tablets by mouth nightly 180 tablet 3    carbidopa-levodopa (SINEMET)  MG per tablet Take 1 tablet by mouth in the morning and at bedtime 60 tablet 3    lisinopril (PRINIVIL;ZESTRIL) 10 MG tablet TAKE 1 TABLET TWICE DAILY FOR HIGH BLOOD PRESSURE 180 tablet 0    simvastatin (ZOCOR) 10 MG tablet TAKE 1 TABLET EVERY NIGHT 90 tablet 0    carvedilol (COREG) 6.25 MG tablet TAKE 1 TABLET TWICE DAILY WITH MEALS FOR HIGH BLOOD PRESSURE 180 tablet 0    vitamin C (ASCORBIC ACID) 500 MG tablet Take 1,000 mg by mouth daily      Cholecalciferol (VITAMIN D3) 125 MCG (5000 UT) TABS Take by mouth      zinc 50 MG TABS tablet Take 50 mg by mouth daily      Elderberry 500 MG CAPS Take 1,250 mg by mouth daily      OIL OF OREGANO PO Take 50 mg by mouth daily      Coenzyme Q10 (CO Q 10) 100 MG CAPS Take by mouth      Fexofenadine HCl (MUCINEX ALLERGY PO) Take by mouth      Chlorpheniramine Maleate (CHLOR-TABLETS PO) Take by mouth as needed      mirtazapine (REMERON) 7.5 MG tablet Take 1 tablet by mouth nightly 30 tablet 5    aspirin 81 MG EC tablet Take 81 mg by mouth daily      nitroGLYCERIN (NITROSTAT) 0.4 MG SL tablet Place 0.4 mg under the tongue every 5 minutes as needed       No current facility-administered medications for this visit.      Health Maintenance   Topic Date Due    Hepatitis C screen  Never done    Shingles vaccine (2 of 3) 11/24/2014    DTaP/Tdap/Td vaccine (2 - Td or Tdap) 05/03/2022    Flu vaccine (1) 01/06/2024 (Originally 8/1/2022)    COVID-19 Vaccine (1) 01/06/2024 (Originally 1945)    Lipids  11/30/2023    Prostate Specific Antigen (PSA) Screening or Monitoring  12/21/2023    Depression Screen  01/05/2024    Annual Wellness Visit (AWV)  01/07/2024    Pneumococcal 65+ years Vaccine  Completed    Hepatitis A vaccine  Aged Out    Hib vaccine  Aged Out    Meningococcal (ACWY) vaccine  Aged Out     Family History   Problem Relation Age of Onset    Cancer Father         Throat    Heart Attack Father         Before age 61    Hypertension Father     Colon Cancer Brother     Glaucoma Maternal Grandmother     Hypertension Mother     Diabetes Mother     Heart Disease Father              Review of Systems  Review of Systems    BP (!) 143/69 (Site: Left Upper Arm, Position: Sitting, Cuff Size: Large Adult)   Pulse 59   Temp 97.3 °F (36.3 °C) (Temporal)   Resp 16   Ht 6' (1.829 m)   Wt 221 lb 6.4 oz (100.4 kg)   BMI 30.03 kg/m²       Physical Exam    Physical Exam  Constitutional:       General: He is not in acute distress. Appearance: He is not ill-appearing. Comments: Patient alert sitting in chair with coarse right arm and hand tremor   HENT:      Head: Normocephalic and atraumatic. Eyes:      Extraocular Movements: Extraocular movements intact. Pupils: Pupils are equal, round, and reactive to light. Cardiovascular:      Rate and Rhythm: Normal rate and regular rhythm. Pulmonary:      Effort: Pulmonary effort is normal.      Breath sounds: Normal breath sounds. Skin:     General: Skin is warm. Neurological:      Mental Status: He is alert. Motor: No weakness. Psychiatric:         Mood and Affect: Mood normal.         Behavior: Behavior normal.       Assessment & Plan    Encounter Diagnoses   Name Primary?     Parkinson disease ([facility] [address] Yes    Other polyneuropathy     Primary insomnia     Chronic systolic HF (heart failure) (HCC)     Prediabetes     Benign prostatic hyperplasia with urinary frequency     Hereditary and idiopathic peripheral neuropathy     Memory loss     Medicare annual wellness visit, subsequent        Current Outpatient Medications   Medication Sig Dispense Refill    Alpha-Lipoic Acid 300 MG CAPS Take by mouth daily      Echinacea 400 MG CAPS Take by mouth daily      mirtazapine (REMERON) 7.5 MG tablet Take 1-2 tablets by mouth nightly 180 tablet 3    carbidopa-levodopa (SINEMET)  MG per tablet Take 1 tablet by mouth in the morning and at bedtime 60 tablet 3    lisinopril (PRINIVIL;ZESTRIL) 10 MG tablet TAKE 1 TABLET TWICE DAILY FOR HIGH BLOOD PRESSURE 180 tablet 0    simvastatin (ZOCOR) 10 MG tablet TAKE 1 TABLET EVERY NIGHT 90 tablet 0    carvedilol (COREG) 6.25 MG tablet TAKE 1 TABLET TWICE DAILY WITH MEALS FOR HIGH BLOOD PRESSURE 180 tablet 0    vitamin C (ASCORBIC ACID) 500 MG tablet Take 1,000 mg by mouth daily      Cholecalciferol (VITAMIN D3) 125 MCG (5000 UT) TABS Take by mouth      zinc 50 MG TABS tablet Take 50 mg by mouth daily      Elderberry 500 MG CAPS Take 1,250 mg by mouth daily      OIL OF OREGANO PO Take 50 mg by mouth daily      Coenzyme Q10 (CO Q 10) 100 MG CAPS Take by mouth      Fexofenadine HCl (MUCINEX ALLERGY PO) Take by mouth      Chlorpheniramine Maleate (CHLOR-TABLETS PO) Take by mouth as needed      mirtazapine (REMERON) 7.5 MG tablet Take 1 tablet by mouth nightly 30 tablet 5    aspirin 81 MG EC tablet Take 81 mg by mouth daily      nitroGLYCERIN (NITROSTAT) 0.4 MG SL tablet Place 0.4 mg under the tongue every 5 minutes as needed       No current facility-administered medications for this visit. Orders Placed This Encounter   Procedures    Ambulatory referral to Neurology     Referral Priority:   Routine     Referral Type:   Eval and Treat     Referral Reason:   Specialty Services Required     Requested Specialty:   Neurology     Number of Visits Requested:   1    AMB POC GLUCOSE BLOOD, BY GLUCOSE MONITORING DEVICE       Medications Discontinued During This Encounter   Medication Reason    carbidopa-levodopa (SINEMET)  MG per tablet ERROR       1. Other polyneuropathy       2. Parkinson disease ([facility]  Will titrate upward on Sinemet and refer to neurology  - carbidopa-levodopa (SINEMET)  MG per tablet; Take 1 tablet by mouth in the morning and at bedtime  Dispense: 60 tablet; Refill: 3  - Ambulatory referral to Neurology    3. Primary insomnia  Continue mirtazapine which is effective  - mirtazapine (REMERON) 7.5 MG tablet; Take 1-2 tablets by mouth nightly  Dispense: 180 tablet; Refill: 3    4. Chronic systolic HF (heart failure) ([facility]  Compensated followed by cardiology    5. Prediabetes  With his hemoglobin A1c of 7.4 and inability to take metformin due to his recurrent diarrhea we will start Ozempic at low-dose.   He was given 1 dose of 0.25 mg today and instructions given to his daughter for weekly use they will provide blood sugars twice daily over the next 2 weeks  - AMB POC GLUCOSE BLOOD, BY GLUCOSE MONITORING DEVICE    6. Benign prostatic hyperplasia with urinary frequency  Followed by urology with elevated PSA    7. Hereditary and idiopathic peripheral neuropathy       8. Memory loss  Possibly due to parkinsonism consider Lewy bodies dementia. No follow-up provider specified. Lisa Thomas MD  Medicare Annual Wellness Visit    Alejandrina Black is here for Medicare AWV (Subsequent ), Follow-up (4 week follow-up), and Discuss Labs (Saw the urologist and the PSA has came down to 9 from a 13 and they will recheck again in March)    Assessment & Plan   Parkinson disease ([facility] [address]  -     carbidopa-levodopa (SINEMET)  MG per tablet; Take 1 tablet by mouth in the morning and at bedtime, Disp-60 tablet, R-3Normal  -     Ambulatory referral to Neurology  Other polyneuropathy  Primary insomnia  -     mirtazapine (REMERON) 7.5 MG tablet; Take 1-2 tablets by mouth nightly, Disp-180 tablet, [id number]  Chronic systolic HF (heart failure) (HCC)  Prediabetes  -     AMB POC GLUCOSE BLOOD, BY GLUCOSE MONITORING DEVICE  Benign prostatic hyperplasia with urinary frequency  Hereditary and idiopathic peripheral neuropathy  Memory loss  Medicare annual wellness visit, subsequent      Recommendations for Preventive Services Due: see orders and patient instructions/AVS.  Recommended screening schedule for the next 5-10 years is provided to the patient in written form: see Patient Instructions/AVS.     Return in about 6 weeks (around 2/17/2023). Subjective   The following acute and/or chronic problems were also addressed today:   Diagnosis Orders   1. Parkinson disease ([facility] [address]  carbidopa-levodopa (SINEMET)  MG per tablet    Ambulatory referral to Neurology      2. Other polyneuropathy        3.  Primary insomnia  mirtazapine (REMERON) 7.5 MG tablet 4. Chronic systolic HF (heart failure) ([facility] [address]        5. Prediabetes  AMB POC GLUCOSE BLOOD, BY GLUCOSE MONITORING DEVICE      6. Benign prostatic hyperplasia with urinary frequency        7. Hereditary and idiopathic peripheral neuropathy        8. Memory loss        9. Medicare annual wellness visit, subsequent              Patient's complete Health Risk Assessment and screening values have been reviewed and are found in Flowsheets. The following problems were reviewed today and where indicated follow up appointments were made and/or referrals ordered. Positive Risk Factor Screenings with Interventions:    Fall Risk:  Do you feel unsteady or are you worried about falling? : (!) yes  2 or more falls in past year?: (!) yes (neuropathy in his feet.)  Fall with injury in past year?: no     Interventions:    Patient advised to follow-up in this office for further evaluation and treatment    Cognitive: Words recalled: 1 Word Recalled (missed sunrise and chair.)           Total Score Interpretation: Abnormal Mini-Cog      Interventions:  Neurology referral             Weight and Activity:  Physical Activity: Insufficiently Active    Days of Exercise per Week: 7 days    Minutes of Exercise per Session: 10 min     On average, how many days per week do you engage in moderate to strenuous exercise (like a brisk walk)?: 7 days  Have you lost any weight without trying in the past 3 months?: No  Body mass index: (!) 30.02    Obesity Interventions:  Patient comments: discussed low carb diet    Obesity Counseling: Patient was asked about his current diet and exercise habits, and personalized advice was provided regarding recommended lifestyle changes. Patient's comorbid health conditions associated with elevated BMI were discussed, as well as the likely benefits of weight loss.  Based upon patient's motivation to change his behavior, the following plan was agreed upon to work toward a weight loss goal of 10 pounds: lower carbohydrate diet. Educational materials for weight loss were provided. Patient will follow-up in 3 month(s) with PCP. Provider spent 5 minutes counseling patient. Vision Screen:  Do you have difficulty driving, watching TV, or doing any of your daily activities because of your eyesight?: (!) Yes (right eye is blurry)  Have you had an eye exam within the past year?: Appointment is scheduled  No results found. Interventions:   Patient declines any further evaluation or treatment    Safety:  Do you have any tripping hazards - clutter in doorways, halls, or stairs?: (!) Yes  Interventions:  Patient advised to follow up in the office for further evaluation and treatment     Advanced Directives:  Do you have a Living Will?: (!) No    Intervention:  has an advanced directive - a copy HAS NOT been provided. Advance Care Planning   Advanced Care Planning: Discussed the patients choices for care and treatment in case of a health event that adversely affects decision-making abilities. Also discussed the patients long-term treatment options. Reviewed with the patient the appropriate state-specific advance directive documents. Reviewed the process of designating a competent adult as an Agent (or -in-fact) that could take make health care decisions for the patient if incompetent. Patient was asked to complete the declaration forms, if they have not already, either acknowledge the forms by a public notary or an eligible witness and provide a signed copy to the practice office. Time spent (minutes): 5        CV Risk Counseling:  Patient was asked about his current diet and exercise habits, and personalized advice was provided regarding recommended lifestyle changes. Patient's individual cardiovascular disease risk factors, including advanced age (> 54 for men, > 65 for women), were discussed, as well as the likely benefits of lifestyle changes.  Based upon patient's motivation to change his behavior, the following plan was agreed upon to work toward lowering cardiovascular disease risk: low saturated fat, low cholesterol diet. Aspirin use for primary prevention of cardiovascular disease for men 45-79 and women 55-79: Not indicated. Educational materials for lifestyle changes were provided. Patient will follow-up in 3 month(s) with PCP. Provider spent 5 minutes counseling patient. Objective   Vitals:    01/06/23 1330   BP: (!) 143/69   Site: Left Upper Arm   Position: Sitting   Cuff Size: Large Adult   Pulse: 59   Resp: 16   Temp: 97.3 °F (36.3 °C)   TempSrc: Temporal   Weight: 221 lb 6.4 oz (100.4 kg)   Height: 6' (1.829 m)      Body mass index is 30.03 kg/m². No Known Allergies  Prior to Visit Medications    Medication Sig Taking?  Authorizing Provider   Alpha-Lipoic Acid 300 MG CAPS Take by mouth daily Yes Historical Provider, MD   Echinacea 400 MG CAPS Take by mouth daily Yes Historical Provider, MD   mirtazapine (REMERON) 7.5 MG tablet Take 1-2 tablets by mouth nightly Yes Andre Carver MD   carbidopa-levodopa (SINEMET)  MG per tablet Take 1 tablet by mouth in the morning and at bedtime Yes Andre Carver MD   lisinopril (PRINIVIL;ZESTRIL) 10 MG tablet TAKE 1 TABLET TWICE DAILY FOR HIGH BLOOD PRESSURE Yes Myesha Godinez MD   simvastatin (ZOCOR) 10 MG tablet TAKE 1 TABLET EVERY NIGHT Yes Myesha Godinez MD   carvedilol (COREG) 6.25 MG tablet TAKE 1 TABLET TWICE DAILY WITH MEALS FOR HIGH BLOOD PRESSURE Yes Myesha Godinez MD   vitamin C (ASCORBIC ACID) 500 MG tablet Take 1,000 mg by mouth daily Yes Historical Provider, MD   Cholecalciferol (VITAMIN D3) 125 MCG (5000 UT) TABS Take by mouth Yes Historical Provider, MD   zinc 50 MG TABS tablet Take 50 mg by mouth daily Yes Historical Provider, MD   Elderberry 500 MG CAPS Take 1,250 mg by mouth daily Yes Historical Provider, MD   OIL OF OREGANO PO Take 50 mg by mouth daily Yes Historical Provider, MD   Coenzyme Q10 (CO Q 10) 100 MG CAPS Take by mouth Yes Historical Provider, MD   Fexofenadine HCl (MUCINEX ALLERGY PO) Take by mouth Yes Historical Provider, MD   Chlorpheniramine Maleate (CHLOR-TABLETS PO) Take by mouth as needed Yes Historical Provider, MD   mirtazapine (REMERON) 7.5 MG tablet Take 1 tablet by mouth nightly Yes Danilo Randhawa MD   aspirin 81 MG EC tablet Take 81 mg by mouth daily Yes Ar Automatic Reconciliation   nitroGLYCERIN (NITROSTAT) 0.4 MG SL tablet Place 0.4 mg under the tongue every 5 minutes as needed Yes Ar Automatic Reconciliation       CareTeam (Including outside providers/suppliers regularly involved in providing care):   Patient Care Team:  Danilo Randhawa MD as PCP - Maggi Marin MD as PCP - [facility] Empaneled Provider     Reviewed and updated this visit:  Tobacco  Allergies  Meds  Med Hx  Surg Hx  Soc Hx  Fam Hx               Medicare Annual Wellness Visit    Janice Hassan is here for Medicare AWV (Subsequent ), Follow-up (4 week follow-up), and Discuss Labs (Saw the urologist and the PSA has came down to 9 from a 15 and they will recheck again in March)    Assessment & Plan   Parkinson disease ([facility] [address]  -     carbidopa-levodopa (SINEMET)  MG per tablet; Take 1 tablet by mouth in the morning and at bedtime, Disp-60 tablet, R-3Normal  -     Ambulatory referral to Neurology  Other polyneuropathy  Primary insomnia  -     mirtazapine (REMERON) 7.5 MG tablet;  Take 1-2 tablets by mouth nightly, Disp-180 tablet, [id number]  Chronic systolic HF (heart failure) (HCC)  Prediabetes  -     AMB POC GLUCOSE BLOOD, BY GLUCOSE MONITORING DEVICE  Benign prostatic hyperplasia with urinary frequency  Hereditary and idiopathic peripheral neuropathy  Memory loss  Medicare annual wellness visit, subsequent      Recommendations for Preventive Services Due: see orders and patient instructions/AVS.  Recommended screening schedule for the next 5-10 years is provided to the patient in written form: see Patient Instructions/AVS.     Return in about 6 weeks (around 2/17/2023). Subjective   The following acute and/or chronic problems were also addressed today:   Diagnosis Orders   1. Parkinson disease ([facility] [address]  carbidopa-levodopa (SINEMET)  MG per tablet    Ambulatory referral to Neurology      2. Other polyneuropathy        3. Primary insomnia  mirtazapine (REMERON) 7.5 MG tablet      4. Chronic systolic HF (heart failure) ([facility] [address]        5. Prediabetes  AMB POC GLUCOSE BLOOD, BY GLUCOSE MONITORING DEVICE      6. Benign prostatic hyperplasia with urinary frequency        7. Hereditary and idiopathic peripheral neuropathy        8. Memory loss        9. Medicare annual wellness visit, subsequent              Patient's complete Health Risk Assessment and screening values have been reviewed and are found in Flowsheets. The following problems were reviewed today and where indicated follow up appointments were made and/or referrals ordered. Positive Risk Factor Screenings with Interventions:    Fall Risk:  Do you feel unsteady or are you worried about falling? : (!) yes  2 or more falls in past year?: (!) yes (neuropathy in his feet.)  Fall with injury in past year?: no     Interventions:    Patient declines any further evaluation or treatment    Cognitive:    Words recalled: 1 Word Recalled (missed sunrise and chair.)           Total Score Interpretation: Abnormal Mini-Cog      Interventions:  Patient declines any further evaluation or treatment             Weight and Activity:  Physical Activity: Insufficiently Active    Days of Exercise per Week: 7 days    Minutes of Exercise per Session: 10 min     On average, how many days per week do you engage in moderate to strenuous exercise (like a brisk walk)?: 7 days  Have you lost any weight without trying in the past 3 months?: No  Body mass index: (!) 30.02    Obesity Interventions:  Patient declines any further evaluation or treatment            Vision Screen:  Do you have difficulty driving, watching TV, or doing any of your daily activities because of your eyesight?: (!) Yes (right eye is blurry)  Have you had an eye exam within the past year?: Appointment is scheduled  No results found. Interventions:   Patient declines any further evaluation or treatment    Safety:  Do you have any tripping hazards - clutter in doorways, halls, or stairs?: (!) Yes  Interventions:  Patient declined any further interventions or treatment     Advanced Directives:  Do you have a Living Will?: (!) No    Intervention:  has an advanced directive - a copy HAS NOT been provided. Objective   Vitals:    01/06/23 1330   BP: (!) 143/69   Site: Left Upper Arm   Position: Sitting   Cuff Size: Large Adult   Pulse: 59   Resp: 16   Temp: 97.3 °F (36.3 °C)   TempSrc: Temporal   Weight: 221 lb 6.4 oz (100.4 kg)   Height: 6' (1.829 m)      Body mass index is 30.03 kg/m². No Known Allergies  Prior to Visit Medications    Medication Sig Taking?  Authorizing Provider   Alpha-Lipoic Acid 300 MG CAPS Take by mouth daily Yes Historical Provider, MD   Echinacea 400 MG CAPS Take by mouth daily Yes Historical Provider, MD   mirtazapine (REMERON) 7.5 MG tablet Take 1-2 tablets by mouth nightly Yes Santos Tamez MD   carbidopa-levodopa (SINEMET)  MG per tablet Take 1 tablet by mouth in the morning and at bedtime Yes Santos Tamez MD   lisinopril (PRINIVIL;ZESTRIL) 10 MG tablet TAKE 1 TABLET TWICE DAILY FOR HIGH BLOOD PRESSURE Yes Mikel Valentin MD   simvastatin (ZOCOR) 10 MG tablet TAKE 1 TABLET EVERY NIGHT Yes Mikel Valentin MD   carvedilol (COREG) 6.25 MG tablet TAKE 1 TABLET TWICE DAILY WITH MEALS FOR HIGH BLOOD PRESSURE Yes Mikel Valentin MD   vitamin C (ASCORBIC ACID) 500 MG tablet Take 1,000 mg by mouth daily Yes Historical Provider, MD   Cholecalciferol (VITAMIN D3) 125 MCG (5000 UT) TABS Take by mouth Yes Historical Provider, MD   zinc 50 MG TABS tablet Take 50 mg by mouth daily Yes Historical Provider, MD   Elderberry 500 MG CAPS Take 1,250 mg by mouth daily Yes Historical Provider, MD   OIL OF OREGANO PO Take 50 mg by mouth daily Yes Historical Provider, MD   Coenzyme Q10 (CO Q 10) 100 MG CAPS Take by mouth Yes Historical Provider, MD   Fexofenadine HCl (MUCINEX ALLERGY PO) Take by mouth Yes Historical Provider, MD   Chlorpheniramine Maleate (CHLOR-TABLETS PO) Take by mouth as needed Yes Historical Provider, MD   mirtazapine (REMERON) 7.5 MG tablet Take 1 tablet by mouth nightly Yes Bryce Ibrahim MD   aspirin 81 MG EC tablet Take 81 mg by mouth daily Yes Ar Automatic Reconciliation   nitroGLYCERIN (NITROSTAT) 0.4 MG SL tablet Place 0.4 mg under the tongue every 5 minutes as needed Yes Ar Automatic Reconciliation       CareTeam (Including outside providers/suppliers regularly involved in providing care):   Patient Care Team:  Bryce Ibrahim MD as PCP - Boby Omalley MD as PCP - [facility] Empaneled Provider     Reviewed and updated this visit:  Tobacco  Allergies  Meds  Med Hx  Surg Hx  Soc Hx  Fam Hx

## 2023-01-06 NOTE — PATIENT INSTRUCTIONS
Preventing Falls: Care Instructions  Overview     Getting around your home safely can be a challenge if you have injuries or health problems that make it easy for you to fall. Loose rugs and furniture in walkways are among the dangers for many older people who have problems walking or who have poor eyesight. People who have conditions such as arthritis, osteoporosis, or dementia also have to be careful not to fall. You can make your home safer with a few simple measures. Follow-up care is a key part of your treatment and safety. Be sure to make and go to all appointments, and call your doctor if you are having problems. It's also a good idea to know your test results and keep a list of the medicines you take. How can you care for yourself at home? Taking care of yourself  Exercise regularly to improve your strength, muscle tone, and balance. Walk if you can. Swimming may be a good choice if you cannot walk easily. Have your vision and hearing checked each year or any time you notice a change. If you have trouble seeing and hearing, you might not be able to avoid objects and could lose your balance. Know the side effects of the medicines you take. Ask your doctor or pharmacist whether the medicines you take can affect your balance. Sleeping pills or sedatives can affect your balance. Limit the amount of alcohol you drink. Alcohol can impair your balance and other senses. Ask your doctor whether calluses or corns on your feet need to be removed. If you wear loose-fitting shoes because of calluses or corns, you can lose your balance and fall. Talk to your doctor if you have numbness in your feet. You may get dizzy if you do not drink enough water. To prevent dehydration, drink plenty of fluids. Choose water and other clear liquids. If you have kidney, heart, or liver disease and have to limit fluids, talk with your doctor before you increase the amount of fluids you drink.   Preventing falls at home  Remove raised doorway thresholds, throw rugs, and clutter. Repair loose carpet or raised areas in the floor. Move furniture and electrical cords to keep them out of walking paths. Use nonskid floor wax, and wipe up spills right away, especially on ceramic tile floors. If you use a walker or cane, put rubber tips on it. If you use crutches, clean the bottoms of them regularly with an abrasive pad, such as steel wool. Keep your house well lit, especially stairways, porches, and outside walkways. Use night-lights in areas such as hallways and bathrooms. Add extra light switches or use remote switches (such as switches that go on or off when you clap your hands) to make it easier to turn lights on if you have to get up during the night. Install sturdy handrails on stairways. Move items in your cabinets so that the things you use a lot are on the lower shelves (about waist level). Keep a cordless phone and a flashlight with new batteries by your bed. If possible, put a phone in each of the main rooms of your house, or carry a cell phone in case you fall and cannot reach a phone. Or, you can wear a device around your neck or wrist. You push a button that sends a signal for help. Wear low-heeled shoes that fit well and give your feet good support. Use footwear with nonskid soles. Check the heels and soles of your shoes for wear. Repair or replace worn heels or soles. Do not wear socks without shoes on smooth floors, such as wood. Walk on the grass when the sidewalks are slippery. If you live in an area that gets snow and ice in the winter, sprinkle salt on slippery steps and sidewalks. Or ask a family member or friend to do this for you. Preventing falls in the bath  Install grab bars and nonskid mats inside and outside your shower or tub and near the toilet and sinks. Use shower chairs and bath benches. Use a hand-held shower head that will allow you to sit while showering.   Get into a tub or shower by putting the weaker leg in first. Get out of a tub or shower with your strong side first.  Repair loose toilet seats and consider installing a raised toilet seat to make getting on and off the toilet easier. Keep your bathroom door unlocked while you are in the shower. Where can you learn more? Go to http://www.zuñiga.com/ and enter G117 to learn more about \"Preventing Falls: Care Instructions. \"  Current as of: May 4, 2022               Content Version: 13.5  © 5560-8591 Healthwise, Incorporated. Care instructions adapted under license by [facility] ([facility]). If you have questions about a medical condition or this instruction, always ask your healthcare professional. [address] any warranty or liability for your use of this information. Hearing Loss: Care Instructions  Overview     Hearing loss is a sudden or slow decrease in how well you hear. It can range from mild to severe. Permanent hearing loss can occur with aging. It also can happen when you are exposed long-term to loud noise. Examples include listening to loud music, riding motorcycles, or being around other loud machines. Hearing loss can affect your work and home life. It can make you feel lonely or depressed. You may feel that you have lost your independence. But hearing aids and other devices can help you hear better and feel connected to others. Follow-up care is a key part of your treatment and safety. Be sure to make and go to all appointments, and call your doctor if you are having problems. It's also a good idea to know your test results and keep a list of the medicines you take. How can you care for yourself at home? Avoid loud noises whenever possible. This helps keep your hearing from getting worse. Always wear hearing protection around loud noises. Wear a hearing aid as directed. See a professional who can help you pick a hearing aid that fits you. Have hearing tests as your doctor suggests. They can show whether your hearing has changed. Your hearing aid may need to be adjusted. Use other devices as needed. These may include:  Telephone amplifiers and hearing aids that can connect to a television, stereo, radio, or microphone. Devices that use lights or vibrations. These alert you to the doorbell, a ringing telephone, or a baby monitor. Television closed-captioning. This shows the words at the bottom of the screen. Most new TVs can do this. TTY (text telephone). This lets you type messages back and forth on the telephone instead of talking or listening. These devices are also called TDD. When messages are typed on the keyboard, they are sent over the phone line to a receiving TTY. The message is shown on a monitor. Use text messaging, social media, and email if it is hard for you to communicate by telephone. Try to learn a listening technique called speechreading. It is not lipreading. You pay attention to people's gestures, expressions, posture, and tone of voice. These clues can help you understand what a person is saying. Face the person you are talking to, and have them face you. Make sure the lighting is good. You need to see the other person's face clearly. Think about counseling if you need help to adjust to your hearing loss. When should you call for help? Watch closely for changes in your health, and be sure to contact your doctor if:    You think your hearing is getting worse.     You have new symptoms, such as dizziness or nausea. Where can you learn more? Go to http://www.[employer].com/ and enter R798 to learn more about \"Hearing Loss: Care Instructions. \"  Current as of: May 4, 2022               Content Version: 13.5  © 6765-6217 Healthwise, Incorporated. Care instructions adapted under license by [facility] ([facility]).  If you have questions about a medical condition or this instruction, always ask your healthcare professional. Angelo Caraballo any warranty or liability for your use of this information. Learning About Vision Tests  What are vision tests? The four most common vision tests are visual acuity tests, refraction, visual field tests, and color vision tests. Visual acuity (sharpness) tests  These tests are used: To see if you need glasses or contact lenses. To monitor an eye problem. To check an eye injury. Visual acuity tests are done as part of routine exams. You may also have this test when you get your 's license or apply for some types of jobs. Visual field tests  These tests are used: To check for vision loss in any area of your range of vision. To screen for certain eye diseases. To look for nerve damage after a stroke, head injury, or other problem that could reduce blood flow to the brain. Refraction and color tests  A refraction test is done to find the right prescription for glasses and contact lenses. A color vision test is done to check for color blindness. Color vision is often tested as part of a routine exam. You may also have this test when you apply for a job where recognizing different colors is important, such as , electronics, or the [employer]. How are vision tests done? Visual acuity test   You cover one eye at a time. You read aloud from a wall chart across the room. You read aloud from a small card that you hold in your hand. Refraction   You look into a special device. The device puts lenses of different strengths in front of each eye to see how strong your glasses or contact lenses need to be. Visual field tests   Your doctor may have you look through special machines. Or your doctor may simply have you stare straight ahead while they move a finger into and out of your field of vision. Color vision test   You look at pieces of printed test patterns in various colors. You say what number or symbol you see.   Your doctor may have you trace the number or symbol using a pointer. How do these tests feel? There is very little chance of having a problem from this test. If dilating drops are used for a vision test, they may make the eyes sting and cause a medicine taste in the mouth. Follow-up care is a key part of your treatment and safety. Be sure to make and go to all appointments, and call your doctor if you are having problems. It's also a good idea to know your test results and keep a list of the medicines you take. Where can you learn more? Go to http://www.zuñiga.com/ and enter G551 to learn more about \"Learning About Vision Tests. \"  Current as of: October 12, 2022               Content Version: 13.5  © 2006-2022 [employer]. Care instructions adapted under license by [facility] ([facility]). If you have questions about a medical condition or this instruction, always ask your healthcare professional. [place] [address] any warranty or liability for your use of this information. Advance Directives: Care Instructions  Overview  An advance directive is a legal way to state your wishes at the end of your life. It tells your family and your doctor what to do if you can't say what you want. There are two main types of advance directives. You can change them any time your wishes change. Living will. This form tells your family and your doctor your wishes about life support and other treatment. The form is also called a declaration. Medical power of . This form lets you name a person to make treatment decisions for you when you can't speak for yourself. This person is called a health care agent (health care proxy, health care surrogate). The form is also called a durable power of  for health care. If you do not have an advance directive, decisions about your medical care may be made by a family member, or by a doctor or a  who doesn't know you.   It may help to think of an advance directive as a gift to the people who care for you. If you have one, they won't have to make tough decisions by themselves. For more information, including forms for your state, see the [address] website (www.caringinfo.org/planning/advance-directives/). Follow-up care is a key part of your treatment and safety. Be sure to make and go to all appointments, and call your doctor if you are having problems. It's also a good idea to know your test results and keep a list of the medicines you take. What should you include in an advance directive? Many states have a unique advance directive form. (It may ask you to address specific issues.) Or you might use a universal form that's approved by many states. If your form doesn't tell you what to address, it may be hard to know what to include in your advance directive. Use the questions below to help you get started. Who do you want to make decisions about your medical care if you are not able to? What life-support measures do you want if you have a serious illness that gets worse over time or can't be cured? What are you most afraid of that might happen? (Maybe you're afraid of having pain, losing your independence, or being kept alive by machines.)  Where would you prefer to die? (Your home? A hospital? A nursing home?)  Do you want to donate your organs when you die? Do you want certain Sabianism practices performed before you die? When should you call for help? Be sure to contact your doctor if you have any questions. Where can you learn more? Go to http://www.PressConnect.com/ and enter R264 to learn more about \"Advance Directives: Care Instructions. \"  Current as of: June 16, 2022               Content Version: 13.5  © 7804-6710 Healthwise, Incorporated. Care instructions adapted under license by [facility] ([facility]).  If you have questions about a medical condition or this instruction, always ask your healthcare professional. Ana Luisa Le disclaims any warranty or liability for your use of this information. Personalized Preventive Plan for Pravin Chapin - 1/6/2023  Medicare offers a range of preventive health benefits. Some of the tests and screenings are paid in full while other may be subject to a deductible, co-insurance, and/or copay. Some of these benefits include a comprehensive review of your medical history including lifestyle, illnesses that may run in your family, and various assessments and screenings as appropriate. After reviewing your medical record and screening and assessments performed today your provider may have ordered immunizations, labs, imaging, and/or referrals for you. A list of these orders (if applicable) as well as your Preventive Care list are included within your After Visit Summary for your review. Other Preventive Recommendations:    A preventive eye exam performed by an eye specialist is recommended every 1-2 years to screen for glaucoma; cataracts, macular degeneration, and other eye disorders. A preventive dental visit is recommended every 6 months. Try to get at least 150 minutes of exercise per week or 10,000 steps per day on a pedometer . Order or download the FREE \"Exercise & Physical Activity: Your Everyday Guide\" from [employer]. Call 0-661.105.1146 or search [employer] online. You need 7922-6838 mg of calcium and 5686-9235 IU of vitamin D per day. It is possible to meet your calcium requirement with diet alone, but a vitamin D supplement is usually necessary to meet this goal.  When exposed to the sun, use a sunscreen that protects against both UVA and UVB radiation with an SPF of 30 or greater. Reapply every 2 to 3 hours or after sweating, drying off with a towel, or swimming. Always wear a seat belt when traveling in a car. Always wear a helmet when riding a bicycle or motorcycle.        Preventing Falls: Care Instructions  Overview     Getting around your home safely can be a challenge if you have injuries or health problems that make it easy for you to fall. Loose rugs and furniture in walkways are among the dangers for many older people who have problems walking or who have poor eyesight. People who have conditions such as arthritis, osteoporosis, or dementia also have to be careful not to fall. You can make your home safer with a few simple measures. Follow-up care is a key part of your treatment and safety. Be sure to make and go to all appointments, and call your doctor if you are having problems. It's also a good idea to know your test results and keep a list of the medicines you take. How can you care for yourself at home? Taking care of yourself  Exercise regularly to improve your strength, muscle tone, and balance. Walk if you can. Swimming may be a good choice if you cannot walk easily. Have your vision and hearing checked each year or any time you notice a change. If you have trouble seeing and hearing, you might not be able to avoid objects and could lose your balance. Know the side effects of the medicines you take. Ask your doctor or pharmacist whether the medicines you take can affect your balance. Sleeping pills or sedatives can affect your balance. Limit the amount of alcohol you drink. Alcohol can impair your balance and other senses. Ask your doctor whether calluses or corns on your feet need to be removed. If you wear loose-fitting shoes because of calluses or corns, you can lose your balance and fall. Talk to your doctor if you have numbness in your feet. You may get dizzy if you do not drink enough water. To prevent dehydration, drink plenty of fluids. Choose water and other clear liquids. If you have kidney, heart, or liver disease and have to limit fluids, talk with your doctor before you increase the amount of fluids you drink. Preventing falls at home  Remove raised doorway thresholds, throw rugs, and clutter.  Repair loose carpet or raised areas in the floor. Move furniture and electrical cords to keep them out of walking paths. Use nonskid floor wax, and wipe up spills right away, especially on ceramic tile floors. If you use a walker or cane, put rubber tips on it. If you use crutches, clean the bottoms of them regularly with an abrasive pad, such as steel wool. Keep your house well lit, especially stairways, porches, and outside walkways. Use night-lights in areas such as hallways and bathrooms. Add extra light switches or use remote switches (such as switches that go on or off when you clap your hands) to make it easier to turn lights on if you have to get up during the night. Install sturdy handrails on stairways. Move items in your cabinets so that the things you use a lot are on the lower shelves (about waist level). Keep a cordless phone and a flashlight with new batteries by your bed. If possible, put a phone in each of the main rooms of your house, or carry a cell phone in case you fall and cannot reach a phone. Or, you can wear a device around your neck or wrist. You push a button that sends a signal for help. Wear low-heeled shoes that fit well and give your feet good support. Use footwear with nonskid soles. Check the heels and soles of your shoes for wear. Repair or replace worn heels or soles. Do not wear socks without shoes on smooth floors, such as wood. Walk on the grass when the sidewalks are slippery. If you live in an area that gets snow and ice in the winter, sprinkle salt on slippery steps and sidewalks. Or ask a family member or friend to do this for you. Preventing falls in the bath  Install grab bars and nonskid mats inside and outside your shower or tub and near the toilet and sinks. Use shower chairs and bath benches. Use a hand-held shower head that will allow you to sit while showering.   Get into a tub or shower by putting the weaker leg in first. Get out of a tub or shower with your strong side first.  Repair loose toilet seats and consider installing a raised toilet seat to make getting on and off the toilet easier. Keep your bathroom door unlocked while you are in the shower. Where can you learn more? Go to http://www.zuñiga.com/ and enter G117 to learn more about \"Preventing Falls: Care Instructions. \"  Current as of: May 4, 2022               Content Version: 13.5  © 2006-2022 [employer]. Care instructions adapted under license by [facility] ([facility]). If you have questions about a medical condition or this instruction, always ask your healthcare professional. [address] any warranty or liability for your use of this information. Hearing Loss: Care Instructions  Overview     Hearing loss is a sudden or slow decrease in how well you hear. It can range from mild to severe. Permanent hearing loss can occur with aging. It also can happen when you are exposed long-term to loud noise. Examples include listening to loud music, riding motorcycles, or being around other loud machines. Hearing loss can affect your work and home life. It can make you feel lonely or depressed. You may feel that you have lost your independence. But hearing aids and other devices can help you hear better and feel connected to others. Follow-up care is a key part of your treatment and safety. Be sure to make and go to all appointments, and call your doctor if you are having problems. It's also a good idea to know your test results and keep a list of the medicines you take. How can you care for yourself at home? Avoid loud noises whenever possible. This helps keep your hearing from getting worse. Always wear hearing protection around loud noises. Wear a hearing aid as directed. See a professional who can help you pick a hearing aid that fits you. Have hearing tests as your doctor suggests. They can show whether your hearing has changed.  Your hearing aid may need to be adjusted. Use other devices as needed. These may include:  Telephone amplifiers and hearing aids that can connect to a television, stereo, radio, or microphone. Devices that use lights or vibrations. These alert you to the doorbell, a ringing telephone, or a baby monitor. Television closed-captioning. This shows the words at the bottom of the screen. Most new TVs can do this. TTY (text telephone). This lets you type messages back and forth on the telephone instead of talking or listening. These devices are also called TDD. When messages are typed on the keyboard, they are sent over the phone line to a receiving TTY. The message is shown on a monitor. Use text messaging, social media, and email if it is hard for you to communicate by telephone. Try to learn a listening technique called speechreading. It is not lipreading. You pay attention to people's gestures, expressions, posture, and tone of voice. These clues can help you understand what a person is saying. Face the person you are talking to, and have them face you. Make sure the lighting is good. You need to see the other person's face clearly. Think about counseling if you need help to adjust to your hearing loss. When should you call for help? Watch closely for changes in your health, and be sure to contact your doctor if:    You think your hearing is getting worse.     You have new symptoms, such as dizziness or nausea. Where can you learn more? Go to http://www.zuñiga.com/ and enter R798 to learn more about \"Hearing Loss: Care Instructions. \"  Current as of: May 4, 2022               Content Version: 13.5  © 0430-7713 Healthwise, Incorporated. Care instructions adapted under license by [facility] ([facility]). If you have questions about a medical condition or this instruction, always ask your healthcare professional. [address] any warranty or liability for your use of this information.            Learning About Vision Tests  What are vision tests? The four most common vision tests are visual acuity tests, refraction, visual field tests, and color vision tests. Visual acuity (sharpness) tests  These tests are used: To see if you need glasses or contact lenses. To monitor an eye problem. To check an eye injury. Visual acuity tests are done as part of routine exams. You may also have this test when you get your 's license or apply for some types of jobs. Visual field tests  These tests are used: To check for vision loss in any area of your range of vision. To screen for certain eye diseases. To look for nerve damage after a stroke, head injury, or other problem that could reduce blood flow to the brain. Refraction and color tests  A refraction test is done to find the right prescription for glasses and contact lenses. A color vision test is done to check for color blindness. Color vision is often tested as part of a routine exam. You may also have this test when you apply for a job where recognizing different colors is important, such as , electronics, or the [employer]. How are vision tests done? Visual acuity test   You cover one eye at a time. You read aloud from a wall chart across the room. You read aloud from a small card that you hold in your hand. Refraction   You look into a special device. The device puts lenses of different strengths in front of each eye to see how strong your glasses or contact lenses need to be. Visual field tests   Your doctor may have you look through special machines. Or your doctor may simply have you stare straight ahead while they move a finger into and out of your field of vision. Color vision test   You look at pieces of printed test patterns in various colors. You say what number or symbol you see. Your doctor may have you trace the number or symbol using a pointer. How do these tests feel?   There is very little chance of having a problem from this test. If dilating drops are used for a vision test, they may make the eyes sting and cause a medicine taste in the mouth. Follow-up care is a key part of your treatment and safety. Be sure to make and go to all appointments, and call your doctor if you are having problems. It's also a good idea to know your test results and keep a list of the medicines you take. Where can you learn more? Go to http://www.zuñiga.com/ and enter G551 to learn more about \"Learning About Vision Tests. \"  Current as of: October 12, 2022               Content Version: 13.5  © 2006-2022 [employer]. Care instructions adapted under license by [facility] ([facility]). If you have questions about a medical condition or this instruction, always ask your healthcare professional. [address] any warranty or liability for your use of this information. Advance Directives: Care Instructions  Overview  An advance directive is a legal way to state your wishes at the end of your life. It tells your family and your doctor what to do if you can't say what you want. There are two main types of advance directives. You can change them any time your wishes change. Living will. This form tells your family and your doctor your wishes about life support and other treatment. The form is also called a declaration. Medical power of . This form lets you name a person to make treatment decisions for you when you can't speak for yourself. This person is called a health care agent (health care proxy, health care surrogate). The form is also called a durable power of  for health care. If you do not have an advance directive, decisions about your medical care may be made by a family member, or by a doctor or a  who doesn't know you. It may help to think of an advance directive as a gift to the people who care for you.  If you have one, they won't have to make tough decisions by themselves. For more information, including forms for your state, see the [address] website (www.caringinfo.org/planning/advance-directives/). Follow-up care is a key part of your treatment and safety. Be sure to make and go to all appointments, and call your doctor if you are having problems. It's also a good idea to know your test results and keep a list of the medicines you take. What should you include in an advance directive? Many states have a unique advance directive form. (It may ask you to address specific issues.) Or you might use a universal form that's approved by many states. If your form doesn't tell you what to address, it may be hard to know what to include in your advance directive. Use the questions below to help you get started. Who do you want to make decisions about your medical care if you are not able to? What life-support measures do you want if you have a serious illness that gets worse over time or can't be cured? What are you most afraid of that might happen? (Maybe you're afraid of having pain, losing your independence, or being kept alive by machines.)  Where would you prefer to die? (Your home? A hospital? A nursing home?)  Do you want to donate your organs when you die? Do you want certain Gnosticist practices performed before you die? When should you call for help? Be sure to contact your doctor if you have any questions. Where can you learn more? Go to http://www.zuñiga.com/ and enter R264 to learn more about \"Advance Directives: Care Instructions. \"  Current as of: June 16, 2022               Content Version: 13.5  © 3718-0986 [employer]. Care instructions adapted under license by [employer]. If you have questions about a medical condition or this instruction, always ask your healthcare professional. [place] [address] any warranty or liability for your use of this information.       Personalized Preventive Plan for Izabella Lin Christina Harvey - 1/6/2023  Medicare offers a range of preventive health benefits. Some of the tests and screenings are paid in full while other may be subject to a deductible, co-insurance, and/or copay. Some of these benefits include a comprehensive review of your medical history including lifestyle, illnesses that may run in your family, and various assessments and screenings as appropriate. After reviewing your medical record and screening and assessments performed today your provider may have ordered immunizations, labs, imaging, and/or referrals for you. A list of these orders (if applicable) as well as your Preventive Care list are included within your After Visit Summary for your review. Other Preventive Recommendations:    A preventive eye exam performed by an eye specialist is recommended every 1-2 years to screen for glaucoma; cataracts, macular degeneration, and other eye disorders. A preventive dental visit is recommended every 6 months. Try to get at least 150 minutes of exercise per week or 10,000 steps per day on a pedometer . Order or download the FREE \"Exercise & Physical Activity: Your Everyday Guide\" from [employer]. Call 2-523.362.5225 or search [employer] online. You need 0863-1447 mg of calcium and 6183-0271 IU of vitamin D per day. It is possible to meet your calcium requirement with diet alone, but a vitamin D supplement is usually necessary to meet this goal.  When exposed to the sun, use a sunscreen that protects against both UVA and UVB radiation with an SPF of 30 or greater. Reapply every 2 to 3 hours or after sweating, drying off with a towel, or swimming. Always wear a seat belt when traveling in a car. Always wear a helmet when riding a bicycle or motorcycle.

## 2023-01-14 ENCOUNTER — PATIENT MESSAGE (OUTPATIENT)
Dept: INTERNAL MEDICINE CLINIC | Facility: CLINIC | Age: 78
End: 2023-01-14

## 2023-01-17 ENCOUNTER — TELEPHONE (OUTPATIENT)
Dept: INTERNAL MEDICINE CLINIC | Facility: CLINIC | Age: 78
End: 2023-01-17

## 2023-01-17 ENCOUNTER — PATIENT MESSAGE (OUTPATIENT)
Dept: INTERNAL MEDICINE CLINIC | Facility: CLINIC | Age: 78
End: 2023-01-17

## 2023-01-17 RX ORDER — SEMAGLUTIDE 1.34 MG/ML
INJECTION, SOLUTION SUBCUTANEOUS WEEKLY
COMMUNITY
End: 2023-01-19 | Stop reason: SDUPTHER

## 2023-01-17 NOTE — TELEPHONE ENCOUNTER
Per CMS,These readings look good, I believe he was started on Ozempic 0.25 mg weekly sampled, please add this to med list, will need rx after 4 weeks and finished sample pen     Medication added and pt notified via [employer]

## 2023-01-19 RX ORDER — SEMAGLUTIDE 1.34 MG/ML
0.5 INJECTION, SOLUTION SUBCUTANEOUS WEEKLY
Qty: 12 ADJUSTABLE DOSE PRE-FILLED PEN SYRINGE | Refills: 3 | Status: SHIPPED | OUTPATIENT
Start: 2023-01-19

## 2023-01-19 NOTE — TELEPHONE ENCOUNTER
From: Molly Odonnell  Sent: 1/18/2023 6:06 PM EST  To: [facility] Clinical Staff  Subject: Blood Sugar Readings    Dez Shepard will take the 0.25mg of the Ozempic for the 3rd dose (this Friday, 1/20) & the 4th dose (Friday, 1/27), of which he has enough doses in the sample. The directions in the sample stated to take 4 doses at 0.25 mg. and then to up it to 0.5mg after the 4 doses. Therefore, Dr. Jeannette Benson would need to write the prescription for 0.5 mg. because the directions in the sample Ozempic said to take 0.25 mg. for 4 doses for treatment initiation, but not for glycemic control and at that point to up the dose to 0.5 mg. He will also need a prescription for pin needles if they do not come with the Ozempic. The prescription would need to be written for [facility] for 90 days (and refills as Dr. Jeannette Benson sees fit).

## 2023-01-31 ENCOUNTER — PATIENT MESSAGE (OUTPATIENT)
Dept: INTERNAL MEDICINE CLINIC | Facility: CLINIC | Age: 78
End: 2023-01-31

## 2023-02-06 RX ORDER — LANCETS 23 GAUGE
EACH MISCELLANEOUS
Qty: 100 EACH | Refills: 3 | Status: SHIPPED | OUTPATIENT
Start: 2023-02-06

## 2023-02-06 RX ORDER — BLOOD-GLUCOSE METER
EACH MISCELLANEOUS
Qty: 1 EACH | Refills: 11 | Status: SHIPPED | OUTPATIENT
Start: 2023-02-06

## 2023-02-06 RX ORDER — CALCIUM CITRATE/VITAMIN D3 200MG-6.25
TABLET ORAL
Qty: 100 EACH | Refills: 3 | Status: SHIPPED | OUTPATIENT
Start: 2023-02-06

## 2023-02-06 RX ORDER — PEN NEEDLE, DIABETIC 31 GX5/16"
NEEDLE, DISPOSABLE MISCELLANEOUS
Qty: 100 EACH | Refills: 3 | Status: SHIPPED | OUTPATIENT
Start: 2023-02-06

## 2023-02-06 NOTE — TELEPHONE ENCOUNTER
Saniya Dominique MD 2/3/2023 1:01 PM EST    Please check on this I am not sure I received any forms on this cms  ----- Message -----  From: Pallavi Ross MA  Sent: 2/1/2023 9:29 AM EST  To: Saniya Dominique MD  Subject: FW: Diabetic Supplies       ----- Message -----  From: Dinah Kaursper  Sent: 1/31/2023 5:12 PM EST  To: , *  Subject: Diabetic Lopez Cortez will send me a diabetic meter, test strips, lancets, and wipes to help me to check my blood sugar levels if you will approve a 90 day supply.  Clifford Mcdowell has let me know that the order is ready for me pending your approval. They sent you a fax request.   Thank you, Simon Corrales

## 2023-02-16 SDOH — ECONOMIC STABILITY: INCOME INSECURITY: HOW HARD IS IT FOR YOU TO PAY FOR THE VERY BASICS LIKE FOOD, HOUSING, MEDICAL CARE, AND HEATING?: NOT VERY HARD

## 2023-02-16 SDOH — ECONOMIC STABILITY: TRANSPORTATION INSECURITY
IN THE PAST 12 MONTHS, HAS LACK OF TRANSPORTATION KEPT YOU FROM MEETINGS, WORK, OR FROM GETTING THINGS NEEDED FOR DAILY LIVING?: NO

## 2023-02-16 SDOH — ECONOMIC STABILITY: HOUSING INSECURITY
IN THE LAST 12 MONTHS, WAS THERE A TIME WHEN YOU DID NOT HAVE A STEADY PLACE TO SLEEP OR SLEPT IN A SHELTER (INCLUDING NOW)?: NO

## 2023-02-16 SDOH — ECONOMIC STABILITY: FOOD INSECURITY: WITHIN THE PAST 12 MONTHS, THE FOOD YOU BOUGHT JUST DIDN'T LAST AND YOU DIDN'T HAVE MONEY TO GET MORE.: NEVER TRUE

## 2023-02-16 SDOH — ECONOMIC STABILITY: FOOD INSECURITY: WITHIN THE PAST 12 MONTHS, YOU WORRIED THAT YOUR FOOD WOULD RUN OUT BEFORE YOU GOT MONEY TO BUY MORE.: NEVER TRUE

## 2023-02-17 ENCOUNTER — OFFICE VISIT (OUTPATIENT)
Dept: INTERNAL MEDICINE CLINIC | Facility: CLINIC | Age: 78
End: 2023-02-17

## 2023-02-17 VITALS
TEMPERATURE: 97.2 F | HEIGHT: 72 IN | BODY MASS INDEX: 29.74 KG/M2 | DIASTOLIC BLOOD PRESSURE: 67 MMHG | OXYGEN SATURATION: 95 % | RESPIRATION RATE: 16 BRPM | HEART RATE: 77 BPM | WEIGHT: 219.6 LBS | SYSTOLIC BLOOD PRESSURE: 137 MMHG

## 2023-02-17 DIAGNOSIS — G20 PARKINSON DISEASE (HCC): Primary | ICD-10-CM

## 2023-02-17 DIAGNOSIS — R35.0 BENIGN PROSTATIC HYPERPLASIA WITH URINARY FREQUENCY: ICD-10-CM

## 2023-02-17 DIAGNOSIS — R73.03 PREDIABETES: ICD-10-CM

## 2023-02-17 DIAGNOSIS — K21.9 GASTROESOPHAGEAL REFLUX DISEASE WITHOUT ESOPHAGITIS: ICD-10-CM

## 2023-02-17 DIAGNOSIS — R41.3 MEMORY LOSS: ICD-10-CM

## 2023-02-17 DIAGNOSIS — N40.1 BENIGN PROSTATIC HYPERPLASIA WITH URINARY FREQUENCY: ICD-10-CM
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
## 2023-02-17 DIAGNOSIS — K59.00 CONSTIPATION, UNSPECIFIED CONSTIPATION TYPE: ICD-10-CM

## 2023-02-17 LAB
EST. AVERAGE GLUCOSE BLD GHB EST-MCNC: 140 MG/DL
HBA1C MFR BLD: 6.5 % (ref 4.8–5.6)
PSA SERPL-MCNC: 9.1 NG/ML

## 2023-02-17 RX ORDER — CALCIUM CITRATE/VITAMIN D3 200MG-6.25
TABLET ORAL
Qty: 200 EACH | Refills: 3 | Status: SHIPPED | OUTPATIENT
Start: 2023-02-17

## 2023-02-17 RX ORDER — LANCETS 33 GAUGE
EACH MISCELLANEOUS
Qty: 200 EACH | Refills: 3 | Status: SHIPPED | OUTPATIENT
Start: 2023-02-17

## 2023-02-17 RX ORDER — PANTOPRAZOLE SODIUM 20 MG/1
20 TABLET, DELAYED RELEASE ORAL
Qty: 90 TABLET | Refills: 1 | Status: SHIPPED | OUTPATIENT
Start: 2023-02-17

## 2023-02-17 RX ORDER — PEN NEEDLE, DIABETIC 31 GX5/16"
NEEDLE, DISPOSABLE MISCELLANEOUS
Qty: 200 EACH | Refills: 3 | Status: SHIPPED | OUTPATIENT
Start: 2023-02-17

## 2023-02-17 ASSESSMENT — PATIENT HEALTH QUESTIONNAIRE - PHQ9
SUM OF ALL RESPONSES TO PHQ QUESTIONS 1-9: 0
SUM OF ALL RESPONSES TO PHQ QUESTIONS 1-9: 0
SUM OF ALL RESPONSES TO PHQ9 QUESTIONS 1 & 2: 0
1. LITTLE INTEREST OR PLEASURE IN DOING THINGS: 0
SUM OF ALL RESPONSES TO PHQ QUESTIONS 1-9: 0
SUM OF ALL RESPONSES TO PHQ QUESTIONS 1-9: 0
2. FEELING DOWN, DEPRESSED OR HOPELESS: 0

## 2023-02-17 NOTE — PROGRESS NOTES
2/17/2023 1:02 PM  Location:[facility] [address] [facility]  [place]  Patient #:  [id number]  YOB: 1945          YOUR LAST HEMOGLOBIN A1CS:   No results found for: HBA1C, [id number]    YOUR LAST LIPID PROFILE:   Lab Results   Component Value Date/Time    CHOL 116 11/30/2022 12:27 PM    HDL 54 11/30/2022 12:27 PM    VLDL 21 04/12/2021 11:45 AM         Lab Results   Component Value Date/Time    GFRAA 85 04/12/2021 11:45 AM    BUN 12 11/30/2022 12:27 PM     11/30/2022 12:27 PM    K 4.4 11/30/2022 12:27 PM     11/30/2022 12:27 PM    CO2 26 11/30/2022 12:27 PM           History of Present Illness     Chief Complaint   Patient presents with    Follow-up     Pt presents to the office today for a 6 week follow-up    Diabetes     Do he need to still continue to check blood sugars twice a day and how often should he send readings on mychart    Constipation     Having constipation; started on Wednesday; don't know if its from the ozempic or from the sinemet or the remeron     Heartburn     Having a lot more heartburn since his stomach isn't digesting his food as quickly; was taking Pepcid but it also has a side effect of constipation    Peripheral Neuropathy     This patient is having significant constipation issues. This started several days ago. He has taken MiraLAX empirically. He also is having increasing problems with reflux and has tried Pepcid but seems that this is causing worsening constipation. His Parkinson's symptoms are improved significantly on Sinemet. He seems to be sleeping better with his mirtazapine as well. Blood sugars that were supplied by home testing showed improvement on Ozempic.     Mr. Sanaz Ly is a 68 y.o. male  who presents for office visit      No Known Allergies  Past Medical History:   Diagnosis Date    Benign neoplasm of colon     Body mass index 32.0-32.9, adult 4/15/2015    Coronary atherosclerosis of unspecified type of vessel, native or graft 4/15/2015    Cough     Encounter for long-term (current) use of other medications     Essential hypertension, benign 4/15/2015    Impotence of organic origin     Inguinal hernia without mention of obstruction or gangrene, bilateral, (not specified as recurrent)     Injury to other specified cranial nerves     Malignant melanoma of skin of other and unspecified parts of face     S/P MOH's    Onychomycosis     Other abnormal glucose 4/15/2015    Pre-Diabetes    Palpitations     Plantar fascial fibromatosis     S/P coronary artery stent placement 11/22/2017    Sciatica     Unspecified hereditary and idiopathic peripheral neuropathy 4/15/2015     Social History     Socioeconomic History    Marital status:    Tobacco Use    Smoking status: Never    Smokeless tobacco: Never   Substance and Sexual Activity    Alcohol use: No    Drug use: No     Social Determinants of Health     Financial Resource Strain: Low Risk     Difficulty of Paying Living Expenses: Not hard at all   Food Insecurity: No Food Insecurity    Worried About Running Out of Food in the Last Year: Never true    Ran Out of Food in the Last Year: Never true   Physical Activity: Insufficiently Active    Days of Exercise per Week: 7 days    Minutes of Exercise per Session: 10 min     Past Surgical History:   Procedure Laterality Date    ANGIOPLASTY  05/22/2007    Coronary    CHOLECYSTECTOMY  09/16/2009    CORONARY ANGIOPLASTY WITH STENT PLACEMENT  1989    [facility]    CORONARY ARTERY BYPASS GRAFT  05/18/2006    VASECTOMY  05/19/1976     Current Outpatient Medications   Medication Sig Dispense Refill    blood glucose test strips (TRUE METRIX BLOOD GLUCOSE TEST) strip Use to check blood sugars twice a day 200 each 3    Lancets 33G MISC Use to check blood sugars twice a day 200 each 3    Alcohol Swabs (ALCOHOL PREP) PADS Use to check blood sugars twice  daily 200 each 3    pantoprazole (PROTONIX) 20 MG tablet Take 1 tablet by mouth every morning (before breakfast) 90 tablet 1    Blood Glucose Monitoring Suppl (TRUE METRIX METER) LUIS FERNANDO Use to check blood sugars once daily 1 each 11    Semaglutide,0.25 or 0.5MG/DOS, (OZEMPIC, 0.25 OR 0.5 MG/DOSE,) 2 MG/1.5ML SOPN Inject 0.5 mg into the skin once a week 12 Adjustable Dose Pre-filled Pen Syringe 3    Alpha-Lipoic Acid 300 MG CAPS Take by mouth daily      Echinacea 400 MG CAPS Take by mouth daily      mirtazapine (REMERON) 7.5 MG tablet Take 1-2 tablets by mouth nightly 180 tablet 3    carbidopa-levodopa (SINEMET)  MG per tablet Take 1 tablet by mouth in the morning and at bedtime 60 tablet 3    lisinopril (PRINIVIL;ZESTRIL) 10 MG tablet TAKE 1 TABLET TWICE DAILY FOR HIGH BLOOD PRESSURE 180 tablet 0    simvastatin (ZOCOR) 10 MG tablet TAKE 1 TABLET EVERY NIGHT 90 tablet 0    carvedilol (COREG) 6.25 MG tablet TAKE 1 TABLET TWICE DAILY WITH MEALS FOR HIGH BLOOD PRESSURE 180 tablet 0    vitamin C (ASCORBIC ACID) 500 MG tablet Take 1,000 mg by mouth daily      Cholecalciferol (VITAMIN D3) 125 MCG (5000 UT) TABS Take by mouth      zinc 50 MG TABS tablet Take 50 mg by mouth daily      Elderberry 500 MG CAPS Take 1,250 mg by mouth daily      OIL OF OREGANO PO Take 50 mg by mouth daily      Coenzyme Q10 (CO Q 10) 100 MG CAPS Take by mouth      aspirin 81 MG EC tablet Take 81 mg by mouth daily      nitroGLYCERIN (NITROSTAT) 0.4 MG SL tablet Place 0.4 mg under the tongue every 5 minutes as needed      Lancets 28G MISC Use to check blood sugars once daily 100 each 3    Fexofenadine HCl (MUCINEX ALLERGY PO) Take by mouth (Patient not taking: Reported on 2/17/2023)      Chlorpheniramine Maleate (CHLOR-TABLETS PO) Take by mouth as needed       No current facility-administered medications for this visit.      Health Maintenance   Topic Date Due    Hepatitis C screen  Never done    Shingles vaccine (2 of 3) 11/24/2014    DTaP/Tdap/Td vaccine (2 - Td or Tdap) 05/03/2022    Flu vaccine (1) 01/06/2024 (Originally 8/1/2022)    COVID-19 Vaccine (1) 01/06/2024 (Originally 1945)    Lipids  11/30/2023    Prostate Specific Antigen (PSA) Screening or Monitoring  12/21/2023    Depression Screen  01/05/2024    Annual Wellness Visit (AWV)  01/07/2024    Pneumococcal 65+ years Vaccine  Completed    Hepatitis A vaccine  Aged Out    Hib vaccine  Aged Out    Meningococcal (ACWY) vaccine  Aged Out     Family History   Problem Relation Age of Onset    Cancer Father         Throat    Heart Attack Father         Before age 61    Hypertension Father     Colon Cancer Brother     Glaucoma Maternal Grandmother     Hypertension Mother     Diabetes Mother     Heart Disease Father              Review of Systems  Review of Systems    /67 (Site: Left Upper Arm, Position: Sitting, Cuff Size: Large Adult)   Pulse 77   Temp 97.2 °F (36.2 °C) (Temporal)   Resp 16   Ht 6' (1.829 m)   Wt 219 lb 9.6 oz (99.6 kg)   SpO2 95% Comment: RA  BMI 29.78 kg/m²       Physical Exam    Physical Exam  Constitutional:       General: He is not in acute distress. Appearance: Normal appearance. He is not ill-appearing. HENT:      Head: Normocephalic and atraumatic. Eyes:      Extraocular Movements: Extraocular movements intact. Pupils: Pupils are equal, round, and reactive to light. Cardiovascular:      Rate and Rhythm: Normal rate and regular rhythm. Pulmonary:      Effort: Pulmonary effort is normal.      Breath sounds: Normal breath sounds. Skin:     General: Skin is warm. Neurological:      Mental Status: He is alert. Motor: Tremor present. No weakness. Psychiatric:         Mood and Affect: Mood normal.         Behavior: Behavior normal.         Thought Content: Thought content normal.         Judgment: Judgment normal.       Assessment & Plan    Encounter Diagnoses   Name Primary?     Parkinson disease ([facility] Yes    Memory loss     Gastroesophageal reflux disease without esophagitis     Prediabetes     Benign prostatic hyperplasia with urinary frequency     Constipation, unspecified constipation type     Fall, subsequent encounter        Current Outpatient Medications   Medication Sig Dispense Refill    blood glucose test strips (TRUE METRIX BLOOD GLUCOSE TEST) strip Use to check blood sugars twice a day 200 each 3    Lancets 33G MISC Use to check blood sugars twice a day 200 each 3    Alcohol Swabs (ALCOHOL PREP) PADS Use to check blood sugars twice  daily 200 each 3    pantoprazole (PROTONIX) 20 MG tablet Take 1 tablet by mouth every morning (before breakfast) 90 tablet 1    Blood Glucose Monitoring Suppl (TRUE METRIX METER) LUIS FERNANDO Use to check blood sugars once daily 1 each 11    Semaglutide,0.25 or 0.5MG/DOS, (OZEMPIC, 0.25 OR 0.5 MG/DOSE,) 2 MG/1.5ML SOPN Inject 0.5 mg into the skin once a week 12 Adjustable Dose Pre-filled Pen Syringe 3    Alpha-Lipoic Acid 300 MG CAPS Take by mouth daily      Echinacea 400 MG CAPS Take by mouth daily      mirtazapine (REMERON) 7.5 MG tablet Take 1-2 tablets by mouth nightly 180 tablet 3    carbidopa-levodopa (SINEMET)  MG per tablet Take 1 tablet by mouth in the morning and at bedtime 60 tablet 3    lisinopril (PRINIVIL;ZESTRIL) 10 MG tablet TAKE 1 TABLET TWICE DAILY FOR HIGH BLOOD PRESSURE 180 tablet 0    simvastatin (ZOCOR) 10 MG tablet TAKE 1 TABLET EVERY NIGHT 90 tablet 0    carvedilol (COREG) 6.25 MG tablet TAKE 1 TABLET TWICE DAILY WITH MEALS FOR HIGH BLOOD PRESSURE 180 tablet 0    vitamin C (ASCORBIC ACID) 500 MG tablet Take 1,000 mg by mouth daily      Cholecalciferol (VITAMIN D3) 125 MCG (5000 UT) TABS Take by mouth      zinc 50 MG TABS tablet Take 50 mg by mouth daily      Elderberry 500 MG CAPS Take 1,250 mg by mouth daily      OIL OF OREGANO PO Take 50 mg by mouth daily      Coenzyme Q10 (CO Q 10) 100 MG CAPS Take by mouth      aspirin 81 MG EC tablet Take 81 mg by mouth daily      nitroGLYCERIN (NITROSTAT) 0.4 MG SL tablet Place 0.4 mg under the tongue every 5 minutes as needed      Lancets 28G MISC Use to check blood sugars once daily 100 each 3    Fexofenadine HCl (MUCINEX ALLERGY PO) Take by mouth (Patient not taking: Reported on 2/17/2023)      Chlorpheniramine Maleate (CHLOR-TABLETS PO) Take by mouth as needed       No current facility-administered medications for this visit. Orders Placed This Encounter   Procedures    Basic Metabolic Panel     Standing Status:   Future     Number of Occurrences:   1     Standing Expiration Date:   2/17/2024    Hemoglobin A1C     Standing Status:   Future     Number of Occurrences:   1     Standing Expiration Date:   2/17/2024    PSA, Diagnostic     Standing Status:   Future     Number of Occurrences:   1     Standing Expiration Date:   2/17/2024    External Referral to Physical Therapy     Referral Priority:   Routine     Referral Type:   Eval and Treat     Referral Reason:   Specialty Services Required     Requested Specialty:   Physical Therapist     Number of Visits Requested:   1       Medications Discontinued During This Encounter   Medication Reason    mirtazapine (REMERON) 7.5 MG tablet LIST CLEANUP    blood glucose test strips (TRUE METRIX BLOOD GLUCOSE TEST) strip REORDER    Alcohol Swabs (ALCOHOL PREP) PADS REORDER       1. Parkinson disease ([address]  Improved on Sinemet we are still awaiting a neurology evaluation  - External Referral to Physical Therapy    2. Memory loss  Stable    3. Gastroesophageal reflux disease without esophagitis  He may need an EGD if his symptoms do not improve  - pantoprazole (PROTONIX) 20 MG tablet; Take 1 tablet by mouth every morning (before breakfast)  Dispense: 90 tablet; Refill: 1    4.  Prediabetes  Home testing shows marked improvement he has had good weight loss  - blood glucose test strips (TRUE METRIX BLOOD GLUCOSE TEST) strip; Use to check blood sugars twice a day Dispense: 200 each; Refill: 3  - Lancets 33G MISC; Use to check blood sugars twice a day  Dispense: 200 each; Refill: 3  - Alcohol Swabs (ALCOHOL PREP) PADS; Use to check blood sugars twice  daily  Dispense: 200 each; Refill: 3  - Basic Metabolic Panel; Future  - Hemoglobin A1C; Future  - Hemoglobin W6G  - Basic Metabolic Panel    5. Benign prostatic hyperplasia with urinary frequency  Followed by urology apparently a PSA was to be scheduled next month at their office we will do this today  - PSA, Diagnostic; Future  - PSA, Diagnostic    6. Constipation, unspecified constipation type  He is asked to take Benefiber daily with 8 ounces of water. 7. Fall, subsequent encounter  Due to Parkinson's and possible peripheral neuropathy we will refer to physical therapy      No follow-up provider specified.         Marco Antonio Pool MD

## 2023-02-18 LAB
ANION GAP SERPL CALC-SCNC: 10 MMOL/L (ref 2–11)
BUN SERPL-MCNC: 19 MG/DL (ref 8–23)
CALCIUM SERPL-MCNC: 9 MG/DL (ref 8.3–10.4)
CHLORIDE SERPL-SCNC: 107 MMOL/L (ref 101–110)
CO2 SERPL-SCNC: 20 MMOL/L (ref 21–32)
CREAT SERPL-MCNC: 1.1 MG/DL (ref 0.8–1.5)
GLUCOSE SERPL-MCNC: 118 MG/DL (ref 65–100)
POTASSIUM SERPL-SCNC: 4.5 MMOL/L (ref 3.5–5.1)
SODIUM SERPL-SCNC: 137 MMOL/L (ref 133–143)

## 2023-05-09 ENCOUNTER — OFFICE VISIT (OUTPATIENT)
Dept: NEUROLOGY | Age: 78
End: 2023-05-09
Payer: MEDICARE

## 2023-05-09 VITALS — HEART RATE: 72 BPM | SYSTOLIC BLOOD PRESSURE: 112 MMHG | DIASTOLIC BLOOD PRESSURE: 74 MMHG | OXYGEN SATURATION: 96 %

## 2023-05-09 DIAGNOSIS — Z74.09 IMPAIRED FUNCTIONAL MOBILITY, BALANCE, GAIT, AND ENDURANCE: ICD-10-CM

## 2023-05-09 DIAGNOSIS — F02.80 DEMENTIA ASSOCIATED WITH PARKINSON'S DISEASE (HCC): ICD-10-CM

## 2023-05-09 DIAGNOSIS — F51.01 PRIMARY INSOMNIA: ICD-10-CM

## 2023-05-09 DIAGNOSIS — G20 PARKINSON'S DISEASE (HCC): Primary | ICD-10-CM

## 2023-05-09 DIAGNOSIS — G20 DEMENTIA ASSOCIATED WITH PARKINSON'S DISEASE (HCC): ICD-10-CM

## 2023-05-09 DIAGNOSIS — G47.52 RBD (REM BEHAVIORAL DISORDER): ICD-10-CM

## 2023-05-09 DIAGNOSIS — G20 PSYCHOSIS DUE TO PARKINSON'S DISEASE (HCC): ICD-10-CM

## 2023-05-09 PROCEDURE — 99205 OFFICE O/P NEW HI 60 MIN: CPT | Performed by: PSYCHIATRY & NEUROLOGY

## 2023-05-09 PROCEDURE — 1036F TOBACCO NON-USER: CPT | Performed by: PSYCHIATRY & NEUROLOGY

## 2023-05-09 PROCEDURE — G8427 DOCREV CUR MEDS BY ELIG CLIN: HCPCS | Performed by: PSYCHIATRY & NEUROLOGY

## 2023-05-09 PROCEDURE — 1123F ACP DISCUSS/DSCN MKR DOCD: CPT | Performed by: PSYCHIATRY & NEUROLOGY

## 2023-05-09 PROCEDURE — 3078F DIAST BP <80 MM HG: CPT | Performed by: PSYCHIATRY & NEUROLOGY

## 2023-05-09 PROCEDURE — 3074F SYST BP LT 130 MM HG: CPT | Performed by: PSYCHIATRY & NEUROLOGY

## 2023-05-09 PROCEDURE — G8417 CALC BMI ABV UP PARAM F/U: HCPCS | Performed by: PSYCHIATRY & NEUROLOGY

## 2023-05-09 RX ORDER — MEMANTINE HYDROCHLORIDE 5 MG/1
5 TABLET ORAL 2 TIMES DAILY
Qty: 180 TABLET | Refills: 3 | Status: SHIPPED | OUTPATIENT
Start: 2023-05-09

## 2023-05-09 NOTE — PROGRESS NOTES
Sky Alberto  [address], [address] [facility], [address] [place] [address]  Phone: [phone] Fax [phone]  Young Cárdenas MD      Patient: Gemma Mares  Provider: Young Cárdenas MD    CC:   Chief Complaint   Patient presents with    New Patient    Tremors     Referring Provider:    History of Present Illness:     Gemma Mares is a 66 y.o. RH male who presents for evaluation of parkinsonism. He is accompanied for today's visit. Extensive review of records (and cognitive and psychiatric rating scales) at today's visit. Symptoms appear to have started approximately 2015 with cognitive deficits. Early onset symptoms are relatively mild such as not turning on his headlights when driving at night, placing items in the wrong place (refrigerator versus cabinets) and being more forgetful about things. Additionally there have been more difficulty coordinating his handwriting and behaviors at night that may have been suggestive of dream enactment behaviors. There has been progressive physical and cognitive deterioration over the last several years. A timeline reveals other episodes at home that unfortunately increase the risk of injury or bodily harm due to lapses in judgment. His short-term memory has declined drastically over the past year. He has become much more dependent in ADLs and IADLs. He began having visual hallucinations around 2021 in addition to other auditory and tactile hallucinations as well. RBD has increased in intensity over the past year. There have been fluctuating levels of attention with long episodes of drowsiness and a tendency to nap during the day. He has trouble verbalizing his thoughts. There has been increasing features of parkinsonism over the last couple of years with a tendency for slowed movements and a gait disturbance highlighted by shuffling of his feet and poor balance. Swallowing difficulty has been noted.   Worsening tremors in the

## 2023-05-14 PROBLEM — F03.B2: Status: ACTIVE | Noted: 2023-05-14

## 2023-05-14 ASSESSMENT — ENCOUNTER SYMPTOMS
VOICE CHANGE: 1
ABDOMINAL PAIN: 0
COUGH: 1

## 2023-05-25 DIAGNOSIS — R97.20 ELEVATED PSA: Primary | ICD-10-CM

## 2023-06-05 ASSESSMENT — PATIENT HEALTH QUESTIONNAIRE - PHQ9
SUM OF ALL RESPONSES TO PHQ QUESTIONS 1-9: 0
SUM OF ALL RESPONSES TO PHQ QUESTIONS 1-9: 0
SUM OF ALL RESPONSES TO PHQ9 QUESTIONS 1 & 2: 0
2. FEELING DOWN, DEPRESSED OR HOPELESS: NOT AT ALL
1. LITTLE INTEREST OR PLEASURE IN DOING THINGS: 0
1. LITTLE INTEREST OR PLEASURE IN DOING THINGS: NOT AT ALL
SUM OF ALL RESPONSES TO PHQ QUESTIONS 1-9: 0
2. FEELING DOWN, DEPRESSED OR HOPELESS: 0
SUM OF ALL RESPONSES TO PHQ9 QUESTIONS 1 & 2: 0
SUM OF ALL RESPONSES TO PHQ QUESTIONS 1-9: 0

## 2023-06-06 ENCOUNTER — OFFICE VISIT (OUTPATIENT)
Dept: INTERNAL MEDICINE CLINIC | Facility: CLINIC | Age: 78
End: 2023-06-06
Payer: MEDICARE

## 2023-06-06 VITALS
HEART RATE: 77 BPM | TEMPERATURE: 97.3 F | DIASTOLIC BLOOD PRESSURE: 58 MMHG | SYSTOLIC BLOOD PRESSURE: 107 MMHG | WEIGHT: 208.2 LBS | RESPIRATION RATE: 16 BRPM | BODY MASS INDEX: 28.2 KG/M2 | HEIGHT: 72 IN

## 2023-06-06 DIAGNOSIS — I10 ESSENTIAL HYPERTENSION, BENIGN: Primary | ICD-10-CM

## 2023-06-06 DIAGNOSIS — G60.9 HEREDITARY AND IDIOPATHIC PERIPHERAL NEUROPATHY: ICD-10-CM

## 2023-06-06 DIAGNOSIS — R73.03 PREDIABETES: ICD-10-CM

## 2023-06-06 DIAGNOSIS — G30.9 ALZHEIMER'S DISEASE, UNSPECIFIED (CODE) (HCC): ICD-10-CM

## 2023-06-06 DIAGNOSIS — G20 PARKINSON DISEASE (HCC): ICD-10-CM

## 2023-06-06 DIAGNOSIS — I50.22 CHRONIC SYSTOLIC HF (HEART FAILURE) (HCC): ICD-10-CM

## 2023-06-06 DIAGNOSIS — F03.B2 MODERATE DEMENTIA WITH PSYCHOTIC DISTURBANCE, UNSPECIFIED DEMENTIA TYPE (HCC): ICD-10-CM

## 2023-06-06 PROBLEM — I21.19 ACUTE MI, INFEROLATERAL WALL, SUBSEQUENT EPISODE OF CARE (HCC): Status: RESOLVED | Noted: 2017-11-22 | Resolved: 2023-06-06

## 2023-06-06 LAB
EST. AVERAGE GLUCOSE BLD GHB EST-MCNC: 126 MG/DL
HBA1C MFR BLD: 6 % (ref 4.8–5.6)

## 2023-06-06 PROCEDURE — 1036F TOBACCO NON-USER: CPT | Performed by: INTERNAL MEDICINE

## 2023-06-06 PROCEDURE — G8417 CALC BMI ABV UP PARAM F/U: HCPCS | Performed by: INTERNAL MEDICINE

## 2023-06-06 PROCEDURE — 1123F ACP DISCUSS/DSCN MKR DOCD: CPT | Performed by: INTERNAL MEDICINE

## 2023-06-06 PROCEDURE — 3074F SYST BP LT 130 MM HG: CPT | Performed by: INTERNAL MEDICINE

## 2023-06-06 PROCEDURE — 99214 OFFICE O/P EST MOD 30 MIN: CPT | Performed by: INTERNAL MEDICINE

## 2023-06-06 PROCEDURE — G8427 DOCREV CUR MEDS BY ELIG CLIN: HCPCS | Performed by: INTERNAL MEDICINE

## 2023-06-06 PROCEDURE — 3078F DIAST BP <80 MM HG: CPT | Performed by: INTERNAL MEDICINE

## 2023-06-06 NOTE — PROGRESS NOTES
blood sugars once daily 1 each 11    Alpha-Lipoic Acid 300 MG CAPS Take by mouth daily      Echinacea 400 MG CAPS Take by mouth daily      mirtazapine (REMERON) 7.5 MG tablet Take 1-2 tablets by mouth nightly 180 tablet 3    lisinopril (PRINIVIL;ZESTRIL) 10 MG tablet TAKE 1 TABLET TWICE DAILY FOR HIGH BLOOD PRESSURE 180 tablet 0    simvastatin (ZOCOR) 10 MG tablet TAKE 1 TABLET EVERY NIGHT 90 tablet 0    carvedilol (COREG) 6.25 MG tablet TAKE 1 TABLET TWICE DAILY WITH MEALS FOR HIGH BLOOD PRESSURE 180 tablet 0    vitamin C (ASCORBIC ACID) 500 MG tablet Take 2 tablets by mouth daily      Cholecalciferol (VITAMIN D3) 125 MCG (5000 UT) TABS Take 5,000 tablets by mouth every evening      zinc 50 MG TABS tablet Take 1 tablet by mouth daily      Elderberry 500 MG CAPS Take 1,250 mg by mouth daily      OIL OF OREGANO PO Take 50 mg by mouth daily      Coenzyme Q10 (CO Q 10) 100 MG CAPS Take by mouth every evening      Fexofenadine HCl (MUCINEX ALLERGY PO) Take by mouth as needed      Chlorpheniramine Maleate (CHLOR-TABLETS PO) Take by mouth as needed      aspirin 81 MG EC tablet Take 1 tablet by mouth daily      nitroGLYCERIN (NITROSTAT) 0.4 MG SL tablet Place 1 tablet under the tongue every 5 minutes as needed      Lancets 33G MISC Use to check blood sugars twice a day 200 each 3     No current facility-administered medications for this visit.      Health Maintenance   Topic Date Due    Hepatitis C screen  Never done    Shingles vaccine (2 of 3) 11/24/2014    DTaP/Tdap/Td vaccine (2 - Td or Tdap) 05/03/2022    Flu vaccine (Season Ended) 01/06/2024 (Originally 8/1/2023)    COVID-19 Vaccine (1) 01/06/2024 (Originally 1945)    Lipids  11/30/2023    Annual Wellness Visit (AWV)  01/07/2024    Prostate Specific Antigen (PSA) Screening or Monitoring  02/17/2024    Depression Screen  06/05/2024    Pneumococcal 65+ years Vaccine  Completed    Hepatitis A vaccine  Aged Out    Hib vaccine  Aged Out    Meningococcal (ACWY) vaccine

## 2023-06-07 LAB
ANION GAP SERPL CALC-SCNC: 6 MMOL/L (ref 2–11)
BUN SERPL-MCNC: 20 MG/DL (ref 8–23)
CALCIUM SERPL-MCNC: 9.6 MG/DL (ref 8.3–10.4)
CHLORIDE SERPL-SCNC: 102 MMOL/L (ref 101–110)
CO2 SERPL-SCNC: 25 MMOL/L (ref 21–32)
CREAT SERPL-MCNC: 1.1 MG/DL (ref 0.8–1.5)
GLUCOSE SERPL-MCNC: 110 MG/DL (ref 65–100)
POTASSIUM SERPL-SCNC: 4.7 MMOL/L (ref 3.5–5.1)
SODIUM SERPL-SCNC: 133 MMOL/L (ref 133–143)

## 2023-06-22 DIAGNOSIS — G20 PARKINSON DISEASE (HCC): ICD-10-CM

## 2023-06-22 RX ORDER — CARBIDOPA/LEVODOPA 25MG-250MG
1 TABLET ORAL 2 TIMES DAILY
Qty: 180 TABLET | Refills: 3 | Status: SHIPPED | OUTPATIENT
Start: 2023-06-22

## 2023-08-15 ENCOUNTER — OFFICE VISIT (OUTPATIENT)
Dept: NEUROLOGY | Age: 78
End: 2023-08-15

## 2023-08-15 VITALS
OXYGEN SATURATION: 94 % | BODY MASS INDEX: 27.67 KG/M2 | DIASTOLIC BLOOD PRESSURE: 62 MMHG | WEIGHT: 204 LBS | HEART RATE: 80 BPM | SYSTOLIC BLOOD PRESSURE: 94 MMHG

## 2023-08-15 DIAGNOSIS — G20 PARKINSON DISEASE (HCC): Primary | ICD-10-CM

## 2023-08-15 DIAGNOSIS — F51.01 PRIMARY INSOMNIA: ICD-10-CM

## 2023-08-15 DIAGNOSIS — Z91.81 HISTORY OF RECENT FALL: ICD-10-CM

## 2023-08-15 DIAGNOSIS — Z74.09 IMPAIRED FUNCTIONAL MOBILITY, BALANCE, GAIT, AND ENDURANCE: ICD-10-CM

## 2023-08-15 DIAGNOSIS — F02.80 DEMENTIA ASSOCIATED WITH PARKINSON'S DISEASE (HCC): ICD-10-CM

## 2023-08-15 DIAGNOSIS — G20 PSYCHOSIS DUE TO PARKINSON'S DISEASE (HCC): ICD-10-CM

## 2023-08-15 DIAGNOSIS — R13.10 DYSPHAGIA, UNSPECIFIED TYPE: ICD-10-CM

## 2023-08-15 DIAGNOSIS — G20 DEMENTIA ASSOCIATED WITH PARKINSON'S DISEASE (HCC): ICD-10-CM

## 2023-08-15 DIAGNOSIS — G47.52 RBD (REM BEHAVIORAL DISORDER): ICD-10-CM

## 2023-08-15 DIAGNOSIS — R49.8 HYPOPHONIA: ICD-10-CM

## 2023-08-15 ASSESSMENT — ENCOUNTER SYMPTOMS
TROUBLE SWALLOWING: 1
VOICE CHANGE: 1
COUGH: 1
CONSTIPATION: 1
ABDOMINAL PAIN: 0

## 2023-08-15 NOTE — PROGRESS NOTES
[address] [facility] [address], [address]  Phone: [phone] Fax [phone]  Alfornia Phalen, NP-C        Patient: Daxa Currie  Provider: Alfornia Phalen, NP-C      CC:   Chief Complaint   Patient presents with    Follow-up     Follow up for Parkinson's     Referring Provider: Marshall Haro MD    History of Present Illness:     Daxa Currie is a 66 y.o. male presents for ongoing follow-up and management of primary parkinsonian syndrome. He is accompanied by his daughter for today's visit. The patient was last seen in office by Dr. Peggy Gottron in May 2023. These notes have been reviewed. Symptoms appear to have started in approximately 2015 with cognitive deficits. Over the past several of years there has been progressive physical and cognitive deterioration. Visual, auditory, and tactile hallucinations began around 2021. More recently he has also experienced worsening RBD, fluctuations levels of attention, difficulty verbalizing thoughts, gait disturbance, tremors and balance instability. Current Neuro Medications:  Sinemet  mg 1 tablet 2 times daily  Mirtazapine 7.5-15 mg QHS   Namenda 5 mg 1 tab 2 times daily     Previous Medication Trials: Today, the patient presents to the office for ongoing follow-up and management of likely Parkinson's disease which is complicated by dementia and psychosis. He continues taking Sinemet  mg 1 tablet 2 times daily. Reports tolerating the medication without difficulty or adverse effect with noticeable benefits. Reports dexterity has improved and that he is able to take care of all daily needs. Tremor reportedly worsens with increased stress and or anxiety, but overall better. Since his last office visit, he reports one fall. He has tolerated memantine without difficulty or adverse effect. Would like to continue. No significant changes since his last office visit; no decline.  Currently feels

## 2023-09-08 NOTE — THERAPY EVALUATION
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 Medications Last Reviewed:  2023     SUBJECTIVE    Past Medical History/Comorbidities:   Mr. Oscar Alcantara  has a past medical history of Acute MI, inferolateral wall, subsequent episode of care [facility]), Benign neoplasm of colon, Body mass index 32.0-32.9, adult, Coronary atherosclerosis of unspecified type of vessel, native or graft, Cough, Encounter for long-term (current) use of other medications, Essential hypertension, benign, Impotence of organic origin, Inguinal hernia without mention of obstruction or gangrene, bilateral, (not specified as recurrent), Injury to other specified cranial nerves, Malignant melanoma of skin of other and unspecified parts of face, Onychomycosis, Other abnormal glucose, Palpitations, Plantar fascial fibromatosis, S/P coronary artery stent placement, Sciatica, and Unspecified hereditary and idiopathic peripheral neuropathy. Mr. Oscar Alcantara  has a past surgical history that includes Coronary artery bypass graft (2006); Coronary angioplasty with stent ();  Cholecystectomy (2009);

## 2023-09-11 ENCOUNTER — HOSPITAL ENCOUNTER (OUTPATIENT)
Dept: PHYSICAL THERAPY | Age: 78
Setting detail: RECURRING SERIES
Discharge: HOME | End: 2023-09-14
Payer: MEDICARE

## 2023-09-11 DIAGNOSIS — R26.81 UNSTEADINESS ON FEET: ICD-10-CM

## 2023-09-11 DIAGNOSIS — R29.3 ABNORMAL POSTURE: ICD-10-CM

## 2023-09-11 DIAGNOSIS — R26.2 DIFFICULTY IN WALKING, NOT ELSEWHERE CLASSIFIED: Primary | ICD-10-CM

## 2023-09-11 PROCEDURE — 92610 EVALUATE SWALLOWING FUNCTION: CPT

## 2023-09-11 PROCEDURE — 97530 THERAPEUTIC ACTIVITIES: CPT

## 2023-09-11 PROCEDURE — 97161 PT EVAL LOW COMPLEX 20 MIN: CPT

## 2023-09-11 ASSESSMENT — PAIN SCALES - GENERAL: PAINLEVEL_OUTOF10: 0

## 2023-09-11 NOTE — THERAPY EVALUATION
English  Is an  required?: No  How does the patient/guardian prefer to learn new concepts?: Listening; Reading; Demonstration; Pictures/Videos  How does the co-learner prefer to learn new concepts?: Listening; Reading; Demonstration; Pictures/Videos       Fall Risk Scale: Schmitz Total Score: 50  Schmitz Fall Risk: High (45 and higher)             OBJECTIVE   Pt is exhibiting decreased coordination in hands and feet, small amplitude movement with finger taps, heel toe, CAS, CARMELITA with extinguishing. Strength is good. Pt has neuropathy but eyes closed, tandem and single leg standing are WFL;    ASSESSMENT   Initial Assessment:  68year old white male with PD diagnosed late 2022 with some dementia. Pt was conversant and appropriate today with good recall of most of his history. Able to follow commands. Poor eye contact. Pt presents with history of frequent falls, especially with turns and transitions, decreased balance, difficulty walking and decreased knowledge of strategies to combat debilitating symptoms of PD. Pt will benefit from skilled physical therapy to maximize strength, balance and gait ability to decrease risk for falls with possible injury or hospitalization. Problem List: (Impacting functional limitations): Body Structures, Functions, Activity Limitations Requiring Skilled Therapeutic Intervention: Decreased functional mobility ; Decreased ROM; Decreased body mechanics; Decreased safe awareness; Decreased sensation; Decreased balance; Decreased fine motor control; Decreased coordination;  Vestibular Impairment; Decreased posture       Therapy Prognosis:   Therapy Prognosis: Good       Assessment Complexity:   Low Complexity  PLAN   Effective Dates: 9/11/2023 TO Plan of Care/Certification Expiration Date: 12/11/23     Frequency/Duration: Plan Frequency: family to try 2 x per week     Interventions Planned (Treatment may consist of any combination of the following):    Current Treatment

## 2023-09-26 NOTE — PROGRESS NOTES
below functional limits. Patient demonstrates mild hypophonia. Pt is able to vary pitch and prosody, maintain vocal intensity. His utterances in conversation remain consistent in volume, and are not noted to start strong and fade with increasing length of utterances. Conversation was appropriate to topic, very social and expressive throughout. Provided carryover tasks to complete with modified independence of reading aloud, structured conversations and instances to use \"loud\" voice (daughter and spouse supporting accountability) daily. We discussed if patient is able to implement these with mod I, he could continue without further speech therapy at this time. Daughter states she knows pt may return for swallowing or voice at a later time if there are changes, with the progression of Parkinson's, and she is able to state the s/sx to look for. Patient and daughter are in agreement that pt will continue independently at this time. I will leave his plan of care open for the next month if there are changes. After that time, I will discharge, and pt and daughter know to get new orders from provider, as needed. Treatment/Session Summary:    Communication/Consultation:  None today  Recommendations/Intent for next treatment session: No further speech therapy at this time.     Total Duration:  Time In: 1330  Time Out: [address]  Minutes: [address] [facility]

## 2023-09-27 ENCOUNTER — HOSPITAL ENCOUNTER (OUTPATIENT)
Dept: PHYSICAL THERAPY | Age: 78
Setting detail: RECURRING SERIES
Discharge: HOME | End: 2023-09-30
Payer: MEDICARE

## 2023-09-27 PROCEDURE — 92507 TX SP LANG VOICE COMM INDIV: CPT

## 2023-09-27 PROCEDURE — 97530 THERAPEUTIC ACTIVITIES: CPT

## 2023-09-27 ASSESSMENT — PAIN SCALES - GENERAL: PAINLEVEL_OUTOF10: 0

## 2023-09-27 NOTE — PROGRESS NOTES
Miguelito Artist  : 1945  Primary: [facility] (Medicare Managed)  Secondary:  [address] [address]  Phone: [phone]  Fax: [phone] Plan Frequency: family to try 2 x per week    Plan of Care/Certification Expiration Date: 23      >PT Visit Info:  Plan Frequency: family to try 2 x per week  Plan of Care/Certification Expiration Date: 23  Total # of Visits Approved: 1  Total # of Visits to Date: 1  Progress Note Counter: 1  Progress Note Due Date: 10/12/23      Visit Count:  2    OUTPATIENT PHYSICAL THERAPY:OP NOTE TYPE: OP Note Type: Treatment Note 2023       Episode  }Appt Desk             Treatment Diagnosis:    Difficulty in walking, not elsewhere classified  Abnormal posture  Unsteadiness on feet       Medical/Referring Diagnosis:  Parkinson disease [G20]  Impaired functional mobility, balance, gait, and endurance [Z74.09]  History of recent fall [Z91.81]  Referring Physician:  DMITRI Degroot MD Orders:  PT Eval and Treat   Date of Onset:  No data recorded   Allergies:   Patient has no known allergies. Restrictions/Precautions:  No data recordedNo data recorded   Cardiac history - easily winded  Interventions Planned (Treatment may consist of any combination of the following):    Current Treatment Recommendations: ROM; Balance training; Functional mobility training; Transfer training; ADL/Self-care training; Cognitive/Perceptual training; Gait training; Stair training; Neuromuscular re-education; Manual; Home exercise program; Safety education & training; Patient/Caregiver education & training; Vestibular rehab; Therapeutic activities     Subjective Comments:  yes have trouble with turns and falls when walking.  had a bunch of cardiac stuff and get winded and worn out easily.   >Initial:     0/10>Post Session:       0/10  Medications Last Reviewed:  2023  Updated Objective

## 2023-10-11 ENCOUNTER — HOSPITAL ENCOUNTER (OUTPATIENT)
Dept: PHYSICAL THERAPY | Age: 78
Setting detail: RECURRING SERIES
Discharge: HOME | End: 2023-10-14
Payer: MEDICARE

## 2023-10-11 PROCEDURE — 97530 THERAPEUTIC ACTIVITIES: CPT

## 2023-10-11 ASSESSMENT — PAIN SCALES - GENERAL: PAINLEVEL_OUTOF10: 2

## 2023-10-11 NOTE — PROGRESS NOTES
Amina Mcclendon  : 1945  Primary: [facility] (Medicare Managed)  Secondary:  [address]  Phone: [phone]  Fax: [phone] Plan Frequency: family to try 2 x per week    Plan of Care/Certification Expiration Date: 23      >PT Visit Info:  Plan Frequency: family to try 2 x per week  Plan of Care/Certification Expiration Date: 23  Total # of Visits Approved: 24  Total # of Visits to Date: 3  Progress Note Counter: 3  Progress Note Due Date: 10/12/23      Visit Count:  3    OUTPATIENT PHYSICAL THERAPY:OP NOTE TYPE: OP Note Type: Treatment Note 10/11/2023       Episode  }Appt Desk             Treatment Diagnosis:    Difficulty in walking, not elsewhere classified  Abnormal posture  Unsteadiness on feet       Medical/Referring Diagnosis:  Parkinson disease [G20. A1]  Impaired functional mobility, balance, gait, and endurance [Z74.09]  History of recent fall [Z91.81]  Referring Physician:  DMITRI Messer MD Orders:  PT Eval and Treat   Date of Onset:  No data recorded   Allergies:   Patient has no known allergies. Restrictions/Precautions:  No data recordedNo data recorded   Cardiac history - easily winded  Interventions Planned (Treatment may consist of any combination of the following):    Current Treatment Recommendations: ROM; Balance training; Functional mobility training; Transfer training; ADL/Self-care training; Cognitive/Perceptual training; Gait training; Stair training; Neuromuscular re-education; Manual; Home exercise program; Safety education & training; Patient/Caregiver education & training; Vestibular rehab; Therapeutic activities     Subjective Comments:  can't really come 2 times a week. He is wiped out for 2 days when he comes here. Trying to do some of the exercies. Get winded. I feel like if I get out and walk, I feel a little better.   Like when I exercise I feel

## 2023-10-16 ENCOUNTER — HOSPITAL ENCOUNTER (OUTPATIENT)
Dept: PHYSICAL THERAPY | Age: 78
Setting detail: RECURRING SERIES
Discharge: HOME | End: 2023-10-19
Payer: MEDICARE

## 2023-10-16 PROCEDURE — 97530 THERAPEUTIC ACTIVITIES: CPT

## 2023-10-16 ASSESSMENT — PAIN SCALES - GENERAL: PAINLEVEL_OUTOF10: 4

## 2023-10-16 ASSESSMENT — PAIN DESCRIPTION - ORIENTATION: ORIENTATION: MID;RIGHT

## 2023-10-16 ASSESSMENT — PAIN DESCRIPTION - PAIN TYPE: TYPE: CHRONIC PAIN

## 2023-10-16 ASSESSMENT — PAIN DESCRIPTION - LOCATION: LOCATION: BACK

## 2023-10-16 NOTE — PROGRESS NOTES
above my belt line more on the right. Medications Last Reviewed:  10/16/2023  Updated Objective Findings:   Both pt and spouse state the littlest things wipe pt out. This has not been observed in the clinic. .  Treatment   THERAPEUTIC ACTIVITY: ( 40 minutes): Therapeutic activities per grid below to improve mobility, strength, balance, coordination, and transitional movements . Required moderate visual, verbal, manual, and tactile cues to  perform correctly and safely. .    Therapeutic Exercise/therapeutic Activity/ PWR!/LSVT BIG:   Date:  9/27/2023 Date:  10/11/2023 Date:  10/16/2023   Activity/Exercise Parameters Parameters    Agility dots in a [place]   All moves, all directions with transitions and holds. Color, left/ right. Multi task with vegetable and fruit calling. Able to get to about 10 before having difficulty. Seated forward, reach up, back, finish  X5 feeling clunk in sternum from prior CABG    Semi sit to stand with chair in front - forward leans -     Sit to stand with hands clasped in front X5 doing ok X5 winded rest    Step and reach - forwards X10 each side at rail. Cues to hold rail more often. X5 each excellent technique and calibration  Occ left leg back scues to keep posture more upright vs. Leaning forward    Step and reach - sideways Facing rail with good ability. X 10 to each side X5 each side. Seated rest for mild SOB    Step and reach - backwards X10 to each side. Cues for technique X5 good with left leg. Festinating after a few reps . Did some [address] and reach - forwards/backwards X10 - 20 each side. Good following of verbal cues. Rock and reach - side to side X10 to eahc side. Tactile cues for full lateral WS                       EDUCATION:  Instructed in above exercises and home program.  HEP:  Written HEP given to patient. Treatment/Session Summary:     Assessment:  Overall good balance. working [employer] with 3 seated rests.   No

## 2023-10-19 ENCOUNTER — HOSPITAL ENCOUNTER (OUTPATIENT)
Dept: PHYSICAL THERAPY | Age: 78
Setting detail: RECURRING SERIES
Discharge: HOME | End: 2023-10-22
Payer: MEDICARE

## 2023-10-19 PROCEDURE — 97530 THERAPEUTIC ACTIVITIES: CPT

## 2023-10-19 ASSESSMENT — PAIN SCALES - GENERAL: PAINLEVEL_OUTOF10: 0

## 2023-10-19 NOTE — PROGRESS NOTES
Holley Vicente  : 1945  Primary: [facility] (Medicare Managed)  Secondary:  [address] [address] [address] [facility] [address]  Phone: [phone]  Fax: [phone] Plan Frequency: family to try 2 x per week    Plan of Care/Certification Expiration Date: 23      >PT Visit Info:  Plan Frequency: family to try 2 x per week  Plan of Care/Certification Expiration Date: 23  Total # of Visits Approved: 24  Total # of Visits to Date: 5  Progress Note Counter: 5  Progress Note Due Date: 10/12/23      Visit Count:  5      OUTPATIENT PHYSICAL THERAPY:OP NOTE TYPE: OP Note Type: Treatment Note 10/19/2023       Episode  }Appt Desk             Treatment Diagnosis:    Difficulty in walking, not elsewhere classified  Abnormal posture  Unsteadiness on feet       Medical/Referring Diagnosis:  Parkinson disease [G20. A1]  Impaired functional mobility, balance, gait, and endurance [Z74.09]  History of recent fall [Z91.81]  Referring Physician:  DMITRI Taylor MD Orders:  PT Eval and Treat   Date of Onset:  No data recorded   Allergies:   Patient has no known allergies. Restrictions/Precautions:  No data recordedNo data recorded   Cardiac history - easily winded  Interventions Planned (Treatment may consist of any combination of the following):    Current Treatment Recommendations: ROM; Balance training; Functional mobility training; Transfer training; ADL/Self-care training; Cognitive/Perceptual training; Gait training; Stair training; Neuromuscular re-education; Manual; Home exercise program; Safety education & training; Patient/Caregiver education & training; Vestibular rehab; Therapeutic activities     Subjective Comments:    Patient reports being tired, but denies any pain. >Initial:     0/10>Post Session:       0/10    Medications Last Reviewed:  10/19/2023  Updated Objective Findings:   No complaints today.   Treatment   THERAPEUTIC

## 2023-10-21 ASSESSMENT — PATIENT HEALTH QUESTIONNAIRE - PHQ9
2. FEELING DOWN, DEPRESSED OR HOPELESS: NOT AT ALL
SUM OF ALL RESPONSES TO PHQ9 QUESTIONS 1 & 2: 0
SUM OF ALL RESPONSES TO PHQ QUESTIONS 1-9: 0
1. LITTLE INTEREST OR PLEASURE IN DOING THINGS: 0
SUM OF ALL RESPONSES TO PHQ QUESTIONS 1-9: 0
1. LITTLE INTEREST OR PLEASURE IN DOING THINGS: NOT AT ALL
2. FEELING DOWN, DEPRESSED OR HOPELESS: 0
SUM OF ALL RESPONSES TO PHQ9 QUESTIONS 1 & 2: 0

## 2023-10-23 ENCOUNTER — APPOINTMENT (OUTPATIENT)
Dept: PHYSICAL THERAPY | Age: 78
End: 2023-10-23
Payer: MEDICARE

## 2023-10-24 ENCOUNTER — OFFICE VISIT (OUTPATIENT)
Dept: INTERNAL MEDICINE CLINIC | Facility: CLINIC | Age: 78
End: 2023-10-24
Payer: MEDICARE

## 2023-10-24 VITALS
DIASTOLIC BLOOD PRESSURE: 67 MMHG | TEMPERATURE: 98.1 F | RESPIRATION RATE: 16 BRPM | HEIGHT: 72 IN | OXYGEN SATURATION: 95 % | SYSTOLIC BLOOD PRESSURE: 130 MMHG | WEIGHT: 202.6 LBS | HEART RATE: 72 BPM | BODY MASS INDEX: 27.44 KG/M2

## 2023-10-24 DIAGNOSIS — I10 ESSENTIAL HYPERTENSION, BENIGN: ICD-10-CM

## 2023-10-24 DIAGNOSIS — B35.1 ONYCHOMYCOSIS: ICD-10-CM

## 2023-10-24 DIAGNOSIS — N40.1 BENIGN PROSTATIC HYPERPLASIA WITH LOWER URINARY TRACT SYMPTOMS, SYMPTOM DETAILS UNSPECIFIED: ICD-10-CM

## 2023-10-24 DIAGNOSIS — G20.A1 PARKINSON'S DISEASE WITHOUT DYSKINESIA OR FLUCTUATING MANIFESTATIONS: ICD-10-CM

## 2023-10-24 DIAGNOSIS — R53.82 CHRONIC FATIGUE: ICD-10-CM

## 2023-10-24 DIAGNOSIS — H91.90 HEARING LOSS, UNSPECIFIED HEARING LOSS TYPE, UNSPECIFIED LATERALITY: ICD-10-CM

## 2023-10-24 DIAGNOSIS — E11.9 TYPE 2 DIABETES MELLITUS WITHOUT COMPLICATION, WITHOUT LONG-TERM CURRENT USE OF INSULIN (HCC): Primary | ICD-10-CM

## 2023-10-24 DIAGNOSIS — R68.89 OTHER GENERAL SYMPTOMS AND SIGNS: ICD-10-CM

## 2023-10-24 DIAGNOSIS — H61.23 BILATERAL IMPACTED CERUMEN: ICD-10-CM

## 2023-10-24 LAB
ALBUMIN SERPL-MCNC: 4.2 G/DL (ref 3.2–4.6)
ALBUMIN/GLOB SERPL: 1.4 (ref 0.4–1.6)
ALP SERPL-CCNC: 80 U/L (ref 50–136)
ALT SERPL-CCNC: 23 U/L (ref 12–65)
ANION GAP SERPL CALC-SCNC: 5 MMOL/L (ref 2–11)
AST SERPL-CCNC: 23 U/L (ref 15–37)
BASOPHILS # BLD: 0.1 K/UL (ref 0–0.2)
BASOPHILS NFR BLD: 1 % (ref 0–2)
BILIRUB SERPL-MCNC: 0.5 MG/DL (ref 0.2–1.1)
BUN SERPL-MCNC: 19 MG/DL (ref 8–23)
CALCIUM SERPL-MCNC: 9.6 MG/DL (ref 8.3–10.4)
CHLORIDE SERPL-SCNC: 105 MMOL/L (ref 101–110)
CHOLEST SERPL-MCNC: 125 MG/DL
CO2 SERPL-SCNC: 28 MMOL/L (ref 21–32)
CREAT SERPL-MCNC: 1.1 MG/DL (ref 0.8–1.5)
DIFFERENTIAL METHOD BLD: ABNORMAL
EOSINOPHIL # BLD: 0.5 K/UL (ref 0–0.8)
EOSINOPHIL NFR BLD: 4 % (ref 0.5–7.8)
ERYTHROCYTE [DISTWIDTH] IN BLOOD BY AUTOMATED COUNT: 12.1 % (ref 11.9–14.6)
EST. AVERAGE GLUCOSE BLD GHB EST-MCNC: 126 MG/DL
GLOBULIN SER CALC-MCNC: 3 G/DL (ref 2.8–4.5)
GLUCOSE SERPL-MCNC: 99 MG/DL (ref 65–100)
HBA1C MFR BLD: 6 % (ref 4.8–5.6)
HCT VFR BLD AUTO: 48.9 % (ref 41.1–50.3)
HDLC SERPL-MCNC: 46 MG/DL (ref 40–60)
HDLC SERPL: 2.7
HGB BLD-MCNC: 15.7 G/DL (ref 13.6–17.2)
IMM GRANULOCYTES # BLD AUTO: 0.1 K/UL (ref 0–0.5)
IMM GRANULOCYTES NFR BLD AUTO: 1 % (ref 0–5)
LDLC SERPL CALC-MCNC: 50 MG/DL
LYMPHOCYTES # BLD: 2.9 K/UL (ref 0.5–4.6)
LYMPHOCYTES NFR BLD: 25 % (ref 13–44)
MCH RBC QN AUTO: 33.1 PG (ref 26.1–32.9)
MCHC RBC AUTO-ENTMCNC: 32.1 G/DL (ref 31.4–35)
MCV RBC AUTO: 102.9 FL (ref 82–102)
MONOCYTES # BLD: 1.1 K/UL (ref 0.1–1.3)
MONOCYTES NFR BLD: 10 % (ref 4–12)
NEUTS SEG # BLD: 7 K/UL (ref 1.7–8.2)
NEUTS SEG NFR BLD: 59 % (ref 43–78)
NRBC # BLD: 0 K/UL (ref 0–0.2)
PLATELET # BLD AUTO: 256 K/UL (ref 150–450)
PMV BLD AUTO: 10.5 FL (ref 9.4–12.3)
POTASSIUM SERPL-SCNC: 4.8 MMOL/L (ref 3.5–5.1)
PROT SERPL-MCNC: 7.2 G/DL (ref 6.3–8.2)
PSA SERPL-MCNC: 11.1 NG/ML
RBC # BLD AUTO: 4.75 M/UL (ref 4.23–5.6)
SODIUM SERPL-SCNC: 138 MMOL/L (ref 133–143)
TRIGL SERPL-MCNC: 145 MG/DL (ref 35–150)
VIT B12 SERPL-MCNC: 406 PG/ML (ref 193–986)
VLDLC SERPL CALC-MCNC: 29 MG/DL (ref 6–23)
WBC # BLD AUTO: 11.6 K/UL (ref 4.3–11.1)

## 2023-10-24 PROCEDURE — 3044F HG A1C LEVEL LT 7.0%: CPT | Performed by: INTERNAL MEDICINE

## 2023-10-24 PROCEDURE — 3078F DIAST BP <80 MM HG: CPT | Performed by: INTERNAL MEDICINE

## 2023-10-24 PROCEDURE — G8484 FLU IMMUNIZE NO ADMIN: HCPCS | Performed by: INTERNAL MEDICINE

## 2023-10-24 PROCEDURE — 69210 REMOVE IMPACTED EAR WAX UNI: CPT | Performed by: INTERNAL MEDICINE

## 2023-10-24 PROCEDURE — 99214 OFFICE O/P EST MOD 30 MIN: CPT | Performed by: INTERNAL MEDICINE

## 2023-10-24 PROCEDURE — 1123F ACP DISCUSS/DSCN MKR DOCD: CPT | Performed by: INTERNAL MEDICINE

## 2023-10-24 PROCEDURE — 3075F SYST BP GE 130 - 139MM HG: CPT | Performed by: INTERNAL MEDICINE

## 2023-10-24 PROCEDURE — G8417 CALC BMI ABV UP PARAM F/U: HCPCS | Performed by: INTERNAL MEDICINE

## 2023-10-24 PROCEDURE — 1036F TOBACCO NON-USER: CPT | Performed by: INTERNAL MEDICINE

## 2023-10-24 PROCEDURE — G8427 DOCREV CUR MEDS BY ELIG CLIN: HCPCS | Performed by: INTERNAL MEDICINE

## 2023-10-26 ENCOUNTER — APPOINTMENT (OUTPATIENT)
Dept: PHYSICAL THERAPY | Age: 78
End: 2023-10-26
Payer: MEDICARE

## 2023-10-28 ASSESSMENT — ENCOUNTER SYMPTOMS
PHOTOPHOBIA: 0
SHORTNESS OF BREATH: 0
SORE THROAT: 0
ABDOMINAL DISTENTION: 0
COUGH: 0
CONSTIPATION: 0
DIARRHEA: 0
ABDOMINAL PAIN: 0

## 2023-10-28 NOTE — PROGRESS NOTES
Negative for cold intolerance, heat intolerance, polydipsia and polyuria. Genitourinary:  Negative for dysuria and hematuria. Musculoskeletal:  Negative for arthralgias, joint swelling and myalgias. Skin:  Negative for rash. Allergic/Immunologic: Negative for environmental allergies and food allergies. Neurological:  Positive for tremors. Negative for dizziness, seizures, syncope and light-headedness. Hematological:  Negative for adenopathy. Does not bruise/bleed easily. Psychiatric/Behavioral:  Negative for agitation, behavioral problems, dysphoric mood and hallucinations. The patient is not nervous/anxious. PHYSICAL EXAM:     Vitals:    10/31/23 1507   BP: 106/74   Pulse: 69   Weight: 91.2 kg (201 lb)   Height: 1.829 m (6')      Wt Readings from Last 3 Encounters:   10/31/23 91.2 kg (201 lb)   10/24/23 91.9 kg (202 lb 9.6 oz)   08/15/23 92.5 kg (204 lb)      BP Readings from Last 3 Encounters:   10/31/23 106/74   10/24/23 130/67   08/15/23 94/62        Physical Exam  Constitutional:       Appearance: Normal appearance. He is normal weight. HENT:      Head: Normocephalic and atraumatic. Nose: Nose normal.      Mouth/Throat:      Mouth: Mucous membranes are moist.      Pharynx: Oropharynx is clear. Eyes:      Extraocular Movements: Extraocular movements intact. Pupils: Pupils are equal, round, and reactive to light. Neck:      Vascular: No carotid bruit or JVD. Cardiovascular:      Rate and Rhythm: Normal rate and regular rhythm. Heart sounds: No murmur heard. No friction rub. No gallop. Pulmonary:      Effort: Pulmonary effort is normal.      Breath sounds: Normal breath sounds. No wheezing or rhonchi. Abdominal:      General: Abdomen is flat. Bowel sounds are normal. There is no distension. Palpations: Abdomen is soft. Tenderness: There is no abdominal tenderness. Musculoskeletal:         General: No swelling. Normal range of motion.       Cervical

## 2023-10-31 ENCOUNTER — OFFICE VISIT (OUTPATIENT)
Age: 78
End: 2023-10-31
Payer: MEDICARE

## 2023-10-31 VITALS
SYSTOLIC BLOOD PRESSURE: 106 MMHG | BODY MASS INDEX: 27.22 KG/M2 | HEART RATE: 69 BPM | WEIGHT: 201 LBS | HEIGHT: 72 IN | DIASTOLIC BLOOD PRESSURE: 74 MMHG

## 2023-10-31 DIAGNOSIS — Z95.1 S/P CABG (CORONARY ARTERY BYPASS GRAFT): ICD-10-CM

## 2023-10-31 DIAGNOSIS — E11.9 TYPE 2 DIABETES MELLITUS WITHOUT COMPLICATION, WITHOUT LONG-TERM CURRENT USE OF INSULIN (HCC): ICD-10-CM

## 2023-10-31 DIAGNOSIS — I10 ESSENTIAL HYPERTENSION, BENIGN: ICD-10-CM

## 2023-10-31 DIAGNOSIS — E78.5 DYSLIPIDEMIA: ICD-10-CM

## 2023-10-31 DIAGNOSIS — I50.22 CHRONIC SYSTOLIC HF (HEART FAILURE) (HCC): Primary | ICD-10-CM

## 2023-10-31 PROCEDURE — 93000 ELECTROCARDIOGRAM COMPLETE: CPT | Performed by: INTERNAL MEDICINE

## 2023-10-31 PROCEDURE — 99214 OFFICE O/P EST MOD 30 MIN: CPT | Performed by: INTERNAL MEDICINE

## 2023-10-31 PROCEDURE — G8417 CALC BMI ABV UP PARAM F/U: HCPCS | Performed by: INTERNAL MEDICINE

## 2023-10-31 PROCEDURE — 3078F DIAST BP <80 MM HG: CPT | Performed by: INTERNAL MEDICINE

## 2023-10-31 PROCEDURE — 1036F TOBACCO NON-USER: CPT | Performed by: INTERNAL MEDICINE

## 2023-10-31 PROCEDURE — 1123F ACP DISCUSS/DSCN MKR DOCD: CPT | Performed by: INTERNAL MEDICINE

## 2023-10-31 PROCEDURE — 3044F HG A1C LEVEL LT 7.0%: CPT | Performed by: INTERNAL MEDICINE

## 2023-10-31 PROCEDURE — G8427 DOCREV CUR MEDS BY ELIG CLIN: HCPCS | Performed by: INTERNAL MEDICINE

## 2023-10-31 PROCEDURE — G8484 FLU IMMUNIZE NO ADMIN: HCPCS | Performed by: INTERNAL MEDICINE

## 2023-10-31 PROCEDURE — 3074F SYST BP LT 130 MM HG: CPT | Performed by: INTERNAL MEDICINE

## 2023-10-31 RX ORDER — CARVEDILOL 6.25 MG/1
6.25 TABLET ORAL 2 TIMES DAILY
Qty: 180 TABLET | Refills: 3 | Status: SHIPPED | OUTPATIENT
Start: 2023-10-31

## 2023-10-31 RX ORDER — SIMVASTATIN 10 MG
10 TABLET ORAL NIGHTLY
Qty: 90 TABLET | Refills: 3 | Status: SHIPPED | OUTPATIENT
Start: 2023-10-31

## 2023-10-31 RX ORDER — LISINOPRIL 10 MG/1
10 TABLET ORAL DAILY
Qty: 90 TABLET | Refills: 3 | Status: SHIPPED | OUTPATIENT
Start: 2023-10-31

## 2023-11-08 DIAGNOSIS — F51.01 PRIMARY INSOMNIA: ICD-10-CM

## 2023-11-08 RX ORDER — MIRTAZAPINE 7.5 MG/1
7.5-15 TABLET, FILM COATED ORAL NIGHTLY
Qty: 180 TABLET | Refills: 3 | Status: SHIPPED | OUTPATIENT
Start: 2023-11-08

## 2023-11-13 ENCOUNTER — OFFICE VISIT (OUTPATIENT)
Dept: NEUROLOGY | Age: 78
End: 2023-11-13
Payer: MEDICARE

## 2023-11-13 VITALS
HEART RATE: 68 BPM | SYSTOLIC BLOOD PRESSURE: 110 MMHG | DIASTOLIC BLOOD PRESSURE: 74 MMHG | BODY MASS INDEX: 27.26 KG/M2 | WEIGHT: 201 LBS | OXYGEN SATURATION: 98 %

## 2023-11-13 DIAGNOSIS — G20.A1 PARKINSON'S DISEASE WITHOUT DYSKINESIA OR FLUCTUATING MANIFESTATIONS: Primary | ICD-10-CM

## 2023-11-13 DIAGNOSIS — G47.52 RBD (REM BEHAVIORAL DISORDER): ICD-10-CM

## 2023-11-13 DIAGNOSIS — F51.01 PRIMARY INSOMNIA: ICD-10-CM

## 2023-11-13 DIAGNOSIS — F02.80 DEMENTIA ASSOCIATED WITH PARKINSON'S DISEASE (HCC): ICD-10-CM

## 2023-11-13 DIAGNOSIS — Z74.09 IMPAIRED FUNCTIONAL MOBILITY, BALANCE, GAIT, AND ENDURANCE: ICD-10-CM

## 2023-11-13 DIAGNOSIS — G20 PSYCHOSIS DUE TO PARKINSON'S DISEASE: ICD-10-CM

## 2023-11-13 DIAGNOSIS — G20 DEMENTIA ASSOCIATED WITH PARKINSON'S DISEASE (HCC): ICD-10-CM

## 2023-11-13 DIAGNOSIS — R49.8 HYPOPHONIA: ICD-10-CM

## 2023-11-13 PROCEDURE — 3074F SYST BP LT 130 MM HG: CPT | Performed by: PSYCHIATRY & NEUROLOGY

## 2023-11-13 PROCEDURE — 1123F ACP DISCUSS/DSCN MKR DOCD: CPT | Performed by: PSYCHIATRY & NEUROLOGY

## 2023-11-13 PROCEDURE — G8484 FLU IMMUNIZE NO ADMIN: HCPCS | Performed by: PSYCHIATRY & NEUROLOGY

## 2023-11-13 PROCEDURE — G8427 DOCREV CUR MEDS BY ELIG CLIN: HCPCS | Performed by: PSYCHIATRY & NEUROLOGY

## 2023-11-13 PROCEDURE — 99215 OFFICE O/P EST HI 40 MIN: CPT | Performed by: PSYCHIATRY & NEUROLOGY

## 2023-11-13 PROCEDURE — G8417 CALC BMI ABV UP PARAM F/U: HCPCS | Performed by: PSYCHIATRY & NEUROLOGY

## 2023-11-13 PROCEDURE — 3078F DIAST BP <80 MM HG: CPT | Performed by: PSYCHIATRY & NEUROLOGY

## 2023-11-13 PROCEDURE — 1036F TOBACCO NON-USER: CPT | Performed by: PSYCHIATRY & NEUROLOGY

## 2023-11-13 ASSESSMENT — PATIENT HEALTH QUESTIONNAIRE - PHQ9
SUM OF ALL RESPONSES TO PHQ9 QUESTIONS 1 & 2: 0
SUM OF ALL RESPONSES TO PHQ QUESTIONS 1-9: 0
1. LITTLE INTEREST OR PLEASURE IN DOING THINGS: 0
2. FEELING DOWN, DEPRESSED OR HOPELESS: 0

## 2023-11-13 ASSESSMENT — ENCOUNTER SYMPTOMS
COUGH: 1
ABDOMINAL PAIN: 0
VOICE CHANGE: 1

## 2023-11-13 NOTE — PROGRESS NOTES
Onset    Cancer Father         Throat    Heart Attack Father         Before age 61    Hypertension Father     Colon Cancer Brother     Glaucoma Maternal Grandmother     Hypertension Mother     Diabetes Mother     Heart Disease Father       SOCIAL HISTORY:  Social History     Socioeconomic History    Marital status:      Spouse name: None    Number of children: None    Years of education: None    Highest education level: None   Tobacco Use    Smoking status: Never    Smokeless tobacco: Never   Substance and Sexual Activity    Alcohol use: No    Drug use: No     Social Determinants of Health     Financial Resource Strain: Low Risk  (11/30/2022)    Overall Financial Resource Strain (CARDIA)     Difficulty of Paying Living Expenses: Not hard at all   Food Insecurity: No Food Insecurity (11/30/2022)    Hunger Vital Sign     Worried About Running Out of Food in the Last Year: Never true     Ran Out of Food in the Last Year: Never true   Physical Activity: Insufficiently Active (1/5/2023)    Exercise Vital Sign     Days of Exercise per Week: 7 days     Minutes of Exercise per Session: 10 min       Medications/Allergies:     MEDICATIONS:   Outpatient Encounter Medications as of 11/13/2023   Medication Sig Dispense Refill    mirtazapine (REMERON) 7.5 MG tablet TAKE 1-2 TABLETS BY MOUTH NIGHTLY 180 tablet 3    carvedilol (COREG) 6.25 MG tablet Take 1 tablet by mouth 2 times daily 180 tablet 3    lisinopril (PRINIVIL;ZESTRIL) 10 MG tablet Take 1 tablet by mouth daily 90 tablet 3    simvastatin (ZOCOR) 10 MG tablet Take 1 tablet by mouth nightly 90 tablet 3    carbidopa-levodopa (SINEMET)  MG per tablet TAKE 1 TABLET BY MOUTH IN THE MORNING AND AT BEDTIME 180 tablet 3    memantine (NAMENDA) 5 MG tablet Take 1 tablet by mouth 2 times daily 180 tablet 3    Semaglutide,0.25 or 0.5MG/DOS, (OZEMPIC, 0.25 OR 0.5 MG/DOSE,) 2 MG/1.5ML SOPN Inject 0.5 mg into the skin once a week (Patient taking differently: Inject 0.25 mg

## 2023-11-29 DIAGNOSIS — R73.03 PREDIABETES: ICD-10-CM

## 2023-11-29 RX ORDER — GLUCOSAM/CHON-MSM1/C/MANG/BOSW 500-416.6
TABLET ORAL
Qty: 200 EACH | Refills: 3 | Status: SHIPPED | OUTPATIENT
Start: 2023-11-29

## 2023-12-30 DIAGNOSIS — R73.03 PREDIABETES: ICD-10-CM

## 2024-01-02 RX ORDER — CALCIUM CITRATE/VITAMIN D3 200MG-6.25
TABLET ORAL
Qty: 200 STRIP | Refills: 3 | Status: SHIPPED | OUTPATIENT
Start: 2024-01-02

## 2024-03-05 DIAGNOSIS — F02.80 DEMENTIA ASSOCIATED WITH PARKINSON'S DISEASE (HCC): ICD-10-CM

## 2024-03-05 DIAGNOSIS — G20.A1 DEMENTIA ASSOCIATED WITH PARKINSON'S DISEASE (HCC): ICD-10-CM

## 2024-03-05 RX ORDER — MEMANTINE HYDROCHLORIDE 5 MG/1
5 TABLET ORAL 2 TIMES DAILY
Qty: 180 TABLET | Refills: 3 | Status: SHIPPED | OUTPATIENT
Start: 2024-03-05

## 2024-04-30 SDOH — HEALTH STABILITY: PHYSICAL HEALTH: ON AVERAGE, HOW MANY DAYS PER WEEK DO YOU ENGAGE IN MODERATE TO STRENUOUS EXERCISE (LIKE A BRISK WALK)?: 2 DAYS

## 2024-04-30 SDOH — ECONOMIC STABILITY: FOOD INSECURITY: WITHIN THE PAST 12 MONTHS, YOU WORRIED THAT YOUR FOOD WOULD RUN OUT BEFORE YOU GOT MONEY TO BUY MORE.: NEVER TRUE

## 2024-04-30 SDOH — ECONOMIC STABILITY: FOOD INSECURITY: WITHIN THE PAST 12 MONTHS, THE FOOD YOU BOUGHT JUST DIDN'T LAST AND YOU DIDN'T HAVE MONEY TO GET MORE.: NEVER TRUE

## 2024-04-30 SDOH — ECONOMIC STABILITY: INCOME INSECURITY: HOW HARD IS IT FOR YOU TO PAY FOR THE VERY BASICS LIKE FOOD, HOUSING, MEDICAL CARE, AND HEATING?: NOT HARD AT ALL

## 2024-04-30 SDOH — HEALTH STABILITY: PHYSICAL HEALTH: ON AVERAGE, HOW MANY MINUTES DO YOU ENGAGE IN EXERCISE AT THIS LEVEL?: 10 MIN

## 2024-04-30 ASSESSMENT — PATIENT HEALTH QUESTIONNAIRE - PHQ9
SUM OF ALL RESPONSES TO PHQ QUESTIONS 1-9: 0
1. LITTLE INTEREST OR PLEASURE IN DOING THINGS: NOT AT ALL
2. FEELING DOWN, DEPRESSED OR HOPELESS: NOT AT ALL
SUM OF ALL RESPONSES TO PHQ9 QUESTIONS 1 & 2: 0

## 2024-04-30 ASSESSMENT — LIFESTYLE VARIABLES
HOW OFTEN DO YOU HAVE SIX OR MORE DRINKS ON ONE OCCASION: 1
HOW OFTEN DO YOU HAVE A DRINK CONTAINING ALCOHOL: 1
HOW MANY STANDARD DRINKS CONTAINING ALCOHOL DO YOU HAVE ON A TYPICAL DAY: 0
HOW OFTEN DO YOU HAVE A DRINK CONTAINING ALCOHOL: NEVER
HOW MANY STANDARD DRINKS CONTAINING ALCOHOL DO YOU HAVE ON A TYPICAL DAY: PATIENT DOES NOT DRINK

## 2024-05-03 ENCOUNTER — OFFICE VISIT (OUTPATIENT)
Dept: INTERNAL MEDICINE CLINIC | Facility: CLINIC | Age: 79
End: 2024-05-03
Payer: MEDICARE

## 2024-05-03 VITALS
RESPIRATION RATE: 16 BRPM | HEIGHT: 72 IN | WEIGHT: 200.2 LBS | HEART RATE: 58 BPM | BODY MASS INDEX: 27.12 KG/M2 | TEMPERATURE: 97.3 F | SYSTOLIC BLOOD PRESSURE: 137 MMHG | DIASTOLIC BLOOD PRESSURE: 62 MMHG

## 2024-05-03 DIAGNOSIS — F03.B2 MODERATE DEMENTIA WITH PSYCHOTIC DISTURBANCE, UNSPECIFIED DEMENTIA TYPE (HCC): ICD-10-CM

## 2024-05-03 DIAGNOSIS — N40.1 BENIGN PROSTATIC HYPERPLASIA WITH URINARY FREQUENCY: ICD-10-CM

## 2024-05-03 DIAGNOSIS — I50.22 CHRONIC SYSTOLIC HF (HEART FAILURE) (HCC): ICD-10-CM

## 2024-05-03 DIAGNOSIS — E11.9 TYPE 2 DIABETES MELLITUS WITHOUT COMPLICATION, WITHOUT LONG-TERM CURRENT USE OF INSULIN (HCC): Primary | ICD-10-CM

## 2024-05-03 DIAGNOSIS — R35.0 BENIGN PROSTATIC HYPERPLASIA WITH URINARY FREQUENCY: ICD-10-CM

## 2024-05-03 LAB
ANION GAP SERPL CALC-SCNC: 9 MMOL/L (ref 9–18)
BUN SERPL-MCNC: 14 MG/DL (ref 8–23)
CALCIUM SERPL-MCNC: 9.3 MG/DL (ref 8.8–10.2)
CHLORIDE SERPL-SCNC: 101 MMOL/L (ref 98–107)
CO2 SERPL-SCNC: 27 MMOL/L (ref 20–28)
CREAT SERPL-MCNC: 1.03 MG/DL (ref 0.8–1.3)
EST. AVERAGE GLUCOSE BLD GHB EST-MCNC: 133 MG/DL
GLUCOSE SERPL-MCNC: 119 MG/DL (ref 70–99)
HBA1C MFR BLD: 6.3 % (ref 0–5.6)
POTASSIUM SERPL-SCNC: 4.8 MMOL/L (ref 3.5–5.1)
PSA SERPL-MCNC: 9.5 NG/ML (ref 0–4)
SODIUM SERPL-SCNC: 137 MMOL/L (ref 136–145)

## 2024-05-03 PROCEDURE — 1036F TOBACCO NON-USER: CPT | Performed by: INTERNAL MEDICINE

## 2024-05-03 PROCEDURE — 3075F SYST BP GE 130 - 139MM HG: CPT | Performed by: INTERNAL MEDICINE

## 2024-05-03 PROCEDURE — 3078F DIAST BP <80 MM HG: CPT | Performed by: INTERNAL MEDICINE

## 2024-05-03 PROCEDURE — 99214 OFFICE O/P EST MOD 30 MIN: CPT | Performed by: INTERNAL MEDICINE

## 2024-05-03 PROCEDURE — G8417 CALC BMI ABV UP PARAM F/U: HCPCS | Performed by: INTERNAL MEDICINE

## 2024-05-03 PROCEDURE — 1123F ACP DISCUSS/DSCN MKR DOCD: CPT | Performed by: INTERNAL MEDICINE

## 2024-05-03 PROCEDURE — G8427 DOCREV CUR MEDS BY ELIG CLIN: HCPCS | Performed by: INTERNAL MEDICINE

## 2024-05-03 RX ORDER — METFORMIN HYDROCHLORIDE 500 MG/1
500 TABLET, EXTENDED RELEASE ORAL
Qty: 30 TABLET | Refills: 1 | Status: SHIPPED | OUTPATIENT
Start: 2024-05-03

## 2024-05-03 RX ORDER — CARBIDOPA/LEVODOPA 25MG-250MG
1 TABLET ORAL 2 TIMES DAILY
Qty: 180 TABLET | Refills: 3 | Status: CANCELLED | OUTPATIENT
Start: 2024-05-03

## 2024-05-03 NOTE — PROGRESS NOTES
5/3/2024 2:32 PM  Location:[facility] PHYSICIAN SERVICES  [facility] INTERNAL MEDICINE  [place]  Patient #:  [id number]  YOB: 1945          YOUR LAST HEMOGLOBIN A1CS:   No results found for: \"HBA1C\", \"XSY5RNCI\"    YOUR LAST LIPID PROFILE:   Lab Results   Component Value Date/Time    CHOL 125 10/24/2023 03:49 PM    HDL 46 10/24/2023 03:49 PM    LDL 50 10/24/2023 03:49 PM    VLDL 29 10/24/2023 03:49 PM    VLDL 21 04/12/2021 11:45 AM         Lab Results   Component Value Date/Time    GFRAA 85 04/12/2021 11:45 AM    BUN 19 10/24/2023 03:49 PM     10/24/2023 03:49 PM    K 4.8 10/24/2023 03:49 PM     10/24/2023 03:49 PM    CO2 28 10/24/2023 03:49 PM           History of Present Illness     Chief Complaint   Patient presents with    Medicare AWV     subsequent    3 Month Follow-Up     Pt presents to the office today for a 3 month follow-up      Diabetes     Want to discuss the ozmepic. Constiipation was a problem.The side effects just got to be too much and he stopped the medication about a month ago.Been trying to control his sugars with diet but its not working too good.  Want to discuss maybe starting on the metformin extended release   This patient did not tolerate Ozempic due to GI side effects mostly constipation.  He has been off of this for the last few weeks occasional glucose levels of 180 are noted for the most part they are in the low 100 range.  His last hemoglobin A1c was 6.  He is scheduled to see neurology in the next 1 to 2 weeks for his Parkinson's disease evaluation.  He has not complained to the family about any significant hallucinations.  He is sleeping well on mirtazapine.    Mr. Nagy is a 79 y.o. male  who presents for office visit      No Known Allergies  Past Medical History:   Diagnosis Date    Acute MI, inferolateral wall, subsequent episode of care ([facility]) 11/22/2017    Benign neoplasm of colon     Body mass index 32.0-32.9, adult 04/15/2015    Coronary

## 2024-05-04 NOTE — PROGRESS NOTES
[facility] CARDIOLOGY  [address] [place] [address], [address]  [place] [address]  PHONE: [phone]        NAME:  Nestor Nagy  : 1945  MRN: [id number]     PCP:  Constantine Motley MD      SUBJECTIVE:   Nestor Nagy is a 79 y.o. male seen for a follow up visit regarding the following:     Chief Complaint   Patient presents with    Congestive Heart Failure       HPI:    Doing well since last visit without interval angina, CHF, palpitations, edema, presyncope or syncope with stable mild severity exertional dyspnea with mildly depressed EF s/p CABG/IMI.  Nuclear stress test in 2019 showed chronic inferolateral scar but no ischemia with ejection fraction 41% which correlates well with prior echocardiography.  Staying active at home but not much exercise other than walking around.  Not really dieting either.  Lipids stable as noted below.  Compliant with medications.  Started Sinemet with new diagnosis of Parkinson's Dz but improving.        Echocardiogram 2024:  Left Ventricle Mildly reduced left ventricular systolic function with a visually estimated EF of 40 - 45%. Left ventricle size is normal. Normal wall thickness. There is thinning, echogenicity, and severe hypokinesis to akinesis of the mid to distal inferolateral and inferoapical walls consistent with prior infarction.  There is no apical thrombus using Definity. Indeterminate diastolic function.   Left Atrium Left atrium is mildly dilated. LA Vol Index is  35 ml/m2.   Right Ventricle Right ventricle size is normal. Normal systolic function.   Right Atrium Right atrium size is normal.   Aortic Valve Valve structure is normal. Mild regurgitation with a centrally directed jet. No stenosis.   Mitral Valve Valve structure is normal. Mild regurgitation. No stenosis noted.   Tricuspid Valve Valve structure is normal. Mild regurgitation with jet direction toward the RA free wall. No stenosis noted. Normal RVSP. The

## 2024-05-09 ENCOUNTER — OFFICE VISIT (OUTPATIENT)
Age: 79
End: 2024-05-09
Payer: MEDICARE

## 2024-05-09 VITALS
DIASTOLIC BLOOD PRESSURE: 58 MMHG | WEIGHT: 199 LBS | HEIGHT: 72 IN | BODY MASS INDEX: 26.95 KG/M2 | SYSTOLIC BLOOD PRESSURE: 110 MMHG | HEART RATE: 64 BPM

## 2024-05-09 DIAGNOSIS — E11.9 TYPE 2 DIABETES MELLITUS WITHOUT COMPLICATION, WITHOUT LONG-TERM CURRENT USE OF INSULIN (HCC): ICD-10-CM

## 2024-05-09 DIAGNOSIS — Z95.1 S/P CABG (CORONARY ARTERY BYPASS GRAFT): ICD-10-CM

## 2024-05-09 DIAGNOSIS — E78.5 DYSLIPIDEMIA: ICD-10-CM

## 2024-05-09 DIAGNOSIS — I10 ESSENTIAL HYPERTENSION, BENIGN: ICD-10-CM

## 2024-05-09 DIAGNOSIS — I50.22 CHRONIC SYSTOLIC HF (HEART FAILURE) (HCC): Primary | ICD-10-CM

## 2024-05-09 PROCEDURE — G8427 DOCREV CUR MEDS BY ELIG CLIN: HCPCS | Performed by: INTERNAL MEDICINE

## 2024-05-09 PROCEDURE — 1123F ACP DISCUSS/DSCN MKR DOCD: CPT | Performed by: INTERNAL MEDICINE

## 2024-05-09 PROCEDURE — 1036F TOBACCO NON-USER: CPT | Performed by: INTERNAL MEDICINE

## 2024-05-09 PROCEDURE — 3074F SYST BP LT 130 MM HG: CPT | Performed by: INTERNAL MEDICINE

## 2024-05-09 PROCEDURE — G8417 CALC BMI ABV UP PARAM F/U: HCPCS | Performed by: INTERNAL MEDICINE

## 2024-05-09 PROCEDURE — 3044F HG A1C LEVEL LT 7.0%: CPT | Performed by: INTERNAL MEDICINE

## 2024-05-09 PROCEDURE — 99214 OFFICE O/P EST MOD 30 MIN: CPT | Performed by: INTERNAL MEDICINE

## 2024-05-09 PROCEDURE — 3078F DIAST BP <80 MM HG: CPT | Performed by: INTERNAL MEDICINE

## 2024-05-20 ENCOUNTER — OFFICE VISIT (OUTPATIENT)
Dept: NEUROLOGY | Age: 79
End: 2024-05-20
Payer: MEDICARE

## 2024-05-20 VITALS
HEART RATE: 74 BPM | DIASTOLIC BLOOD PRESSURE: 73 MMHG | WEIGHT: 198.8 LBS | SYSTOLIC BLOOD PRESSURE: 112 MMHG | OXYGEN SATURATION: 93 % | BODY MASS INDEX: 26.96 KG/M2

## 2024-05-20 DIAGNOSIS — G20.A1 PARKINSON'S DISEASE WITHOUT DYSKINESIA OR FLUCTUATING MANIFESTATIONS (HCC): Primary | ICD-10-CM

## 2024-05-20 DIAGNOSIS — R49.8 HYPOPHONIA: ICD-10-CM

## 2024-05-20 DIAGNOSIS — F02.80 DEMENTIA ASSOCIATED WITH PARKINSON'S DISEASE (HCC): ICD-10-CM

## 2024-05-20 DIAGNOSIS — G20.A1 PSYCHOSIS DUE TO PARKINSON'S DISEASE (HCC): ICD-10-CM

## 2024-05-20 DIAGNOSIS — F51.01 PRIMARY INSOMNIA: ICD-10-CM

## 2024-05-20 DIAGNOSIS — G20.A1 DEMENTIA ASSOCIATED WITH PARKINSON'S DISEASE (HCC): ICD-10-CM

## 2024-05-20 DIAGNOSIS — F06.8 PSYCHOSIS DUE TO PARKINSON'S DISEASE (HCC): ICD-10-CM

## 2024-05-20 DIAGNOSIS — G47.52 RBD (REM BEHAVIORAL DISORDER): ICD-10-CM

## 2024-05-20 DIAGNOSIS — Z74.09 IMPAIRED FUNCTIONAL MOBILITY, BALANCE, GAIT, AND ENDURANCE: ICD-10-CM

## 2024-05-20 PROCEDURE — 3074F SYST BP LT 130 MM HG: CPT | Performed by: PSYCHIATRY & NEUROLOGY

## 2024-05-20 PROCEDURE — 1036F TOBACCO NON-USER: CPT | Performed by: PSYCHIATRY & NEUROLOGY

## 2024-05-20 PROCEDURE — 1123F ACP DISCUSS/DSCN MKR DOCD: CPT | Performed by: PSYCHIATRY & NEUROLOGY

## 2024-05-20 PROCEDURE — 3078F DIAST BP <80 MM HG: CPT | Performed by: PSYCHIATRY & NEUROLOGY

## 2024-05-20 PROCEDURE — G8427 DOCREV CUR MEDS BY ELIG CLIN: HCPCS | Performed by: PSYCHIATRY & NEUROLOGY

## 2024-05-20 PROCEDURE — G8417 CALC BMI ABV UP PARAM F/U: HCPCS | Performed by: PSYCHIATRY & NEUROLOGY

## 2024-05-20 PROCEDURE — 99215 OFFICE O/P EST HI 40 MIN: CPT | Performed by: PSYCHIATRY & NEUROLOGY

## 2024-05-20 ASSESSMENT — ENCOUNTER SYMPTOMS
COUGH: 1
ABDOMINAL PAIN: 0
VOICE CHANGE: 1

## 2024-05-20 NOTE — PROGRESS NOTES
night.  Discussed the possibility of a dosage increase if needed.    Hypophonia/dysphagia:   Discussed referral to speech therapy if needed.      Follow-up 4 months.      Signature: Zaid Umana MD      Date:  5/23/2024    [address], [place] [address]  Ph: [phone]  Fax: [phone]         I have personally interviewed and examined Mr. Nestor Nagy and I have personally reviewed all relevant records including labs and imaging as noted above. I have written all aspects of this note. More than 50% of this time was used for counseling regarding my diagnosis, prognosis, and plans for management. Total visit time: 42 minutes.

## 2024-06-01 ENCOUNTER — PATIENT MESSAGE (OUTPATIENT)
Dept: INTERNAL MEDICINE CLINIC | Facility: CLINIC | Age: 79
End: 2024-06-01

## 2024-06-03 RX ORDER — METFORMIN HYDROCHLORIDE 500 MG/1
500 TABLET, EXTENDED RELEASE ORAL
Qty: 90 TABLET | Refills: 3 | Status: SHIPPED | OUTPATIENT
Start: 2024-06-03

## 2024-06-03 NOTE — TELEPHONE ENCOUNTER
From: Nestor Nagy  To: Dr. Constantine Motley  Sent: 6/1/2024 1:49 PM EDT  Subject: Metformin HCL  mg.     The Extended Release Metformin has worked well for Nestor since you prescribed it. His AM fasting blood sugar has not dropped below 100, and his postprandial numbers are almost always under 160, which is your goal for him. He has tolerated it well, with no apparent side effects.   Please send a new prescription for this medication to [facility].  Thank you so much for everything you do to help us manage his diabetes and his other health issues.  Thank you,   Sam Nagy (spouse of your patient, Nestor Nagy)

## 2024-06-15 DIAGNOSIS — G20.A1 PARKINSON DISEASE (HCC): ICD-10-CM

## 2024-06-17 RX ORDER — CARBIDOPA/LEVODOPA 25MG-250MG
1 TABLET ORAL 2 TIMES DAILY
Qty: 180 TABLET | Refills: 3 | Status: SHIPPED | OUTPATIENT
Start: 2024-06-17

## 2024-08-21 RX ORDER — SIMVASTATIN 10 MG
10 TABLET ORAL NIGHTLY
Qty: 90 TABLET | Refills: 3 | Status: SHIPPED | OUTPATIENT
Start: 2024-08-21

## 2024-08-21 RX ORDER — CARVEDILOL 6.25 MG/1
6.25 TABLET ORAL 2 TIMES DAILY
Qty: 180 TABLET | Refills: 3 | Status: SHIPPED | OUTPATIENT
Start: 2024-08-21

## 2024-08-21 RX ORDER — LISINOPRIL 10 MG/1
10 TABLET ORAL DAILY
Qty: 90 TABLET | Refills: 3 | Status: SHIPPED | OUTPATIENT
Start: 2024-08-21

## 2024-08-21 NOTE — TELEPHONE ENCOUNTER
Requested Prescriptions     Pending Prescriptions Disp Refills    carvedilol (COREG) 6.25 MG tablet [Pharmacy Med Name: CARVEDILOL 6.25 MG Tablet] 180 tablet 3     Sig: TAKE 1 TABLET TWICE DAILY    simvastatin (ZOCOR) 10 MG tablet [Pharmacy Med Name: SIMVASTATIN 10 MG Tablet] 90 tablet 3     Sig: TAKE 1 TABLET EVERY NIGHT    lisinopril (PRINIVIL;ZESTRIL) 10 MG tablet [Pharmacy Med Name: LISINOPRIL 10 MG Tablet] 90 tablet 3     Sig: TAKE 1 TABLET EVERY DAY     Verified rx. Last OV 05/09/24. Erx to pharm on file.    Last lipid panel 10/24/23

## 2024-08-29 DIAGNOSIS — F51.01 PRIMARY INSOMNIA: ICD-10-CM

## 2024-08-29 RX ORDER — MIRTAZAPINE 7.5 MG/1
TABLET, FILM COATED ORAL
Qty: 180 TABLET | Refills: 3 | Status: SHIPPED | OUTPATIENT
Start: 2024-08-29

## 2024-09-13 ENCOUNTER — PATIENT MESSAGE (OUTPATIENT)
Dept: NEUROLOGY | Age: 79
End: 2024-09-13

## 2024-09-18 DIAGNOSIS — R73.03 PREDIABETES: ICD-10-CM

## 2024-09-18 RX ORDER — GLUCOSAM/CHON-MSM1/C/MANG/BOSW 500-416.6
TABLET ORAL
Qty: 200 EACH | Refills: 3 | Status: SHIPPED | OUTPATIENT
Start: 2024-09-18

## 2024-09-24 SDOH — HEALTH STABILITY: PHYSICAL HEALTH: ON AVERAGE, HOW MANY DAYS PER WEEK DO YOU ENGAGE IN MODERATE TO STRENUOUS EXERCISE (LIKE A BRISK WALK)?: 0 DAYS

## 2024-09-24 SDOH — HEALTH STABILITY: PHYSICAL HEALTH: ON AVERAGE, HOW MANY MINUTES DO YOU ENGAGE IN EXERCISE AT THIS LEVEL?: 0 MIN

## 2024-09-24 ASSESSMENT — PATIENT HEALTH QUESTIONNAIRE - PHQ9
9. THOUGHTS THAT YOU WOULD BE BETTER OFF DEAD, OR OF HURTING YOURSELF: NOT AT ALL
3. TROUBLE FALLING OR STAYING ASLEEP: NEARLY EVERY DAY
2. FEELING DOWN, DEPRESSED OR HOPELESS: SEVERAL DAYS
4. FEELING TIRED OR HAVING LITTLE ENERGY: NEARLY EVERY DAY
SUM OF ALL RESPONSES TO PHQ QUESTIONS 1-9: 14
SUM OF ALL RESPONSES TO PHQ QUESTIONS 1-9: 14
1. LITTLE INTEREST OR PLEASURE IN DOING THINGS: NEARLY EVERY DAY
SUM OF ALL RESPONSES TO PHQ QUESTIONS 1-9: 14
6. FEELING BAD ABOUT YOURSELF - OR THAT YOU ARE A FAILURE OR HAVE LET YOURSELF OR YOUR FAMILY DOWN: SEVERAL DAYS
10. IF YOU CHECKED OFF ANY PROBLEMS, HOW DIFFICULT HAVE THESE PROBLEMS MADE IT FOR YOU TO DO YOUR WORK, TAKE CARE OF THINGS AT HOME, OR GET ALONG WITH OTHER PEOPLE: VERY DIFFICULT
SUM OF ALL RESPONSES TO PHQ QUESTIONS 1-9: 14
7. TROUBLE CONCENTRATING ON THINGS, SUCH AS READING THE NEWSPAPER OR WATCHING TELEVISION: SEVERAL DAYS
8. MOVING OR SPEAKING SO SLOWLY THAT OTHER PEOPLE COULD HAVE NOTICED. OR THE OPPOSITE, BEING SO FIGETY OR RESTLESS THAT YOU HAVE BEEN MOVING AROUND A LOT MORE THAN USUAL: SEVERAL DAYS
5. POOR APPETITE OR OVEREATING: SEVERAL DAYS

## 2024-09-25 ENCOUNTER — OFFICE VISIT (OUTPATIENT)
Dept: NEUROLOGY | Age: 79
End: 2024-09-25
Payer: MEDICARE

## 2024-09-25 VITALS
HEIGHT: 72 IN | DIASTOLIC BLOOD PRESSURE: 68 MMHG | BODY MASS INDEX: 25.33 KG/M2 | HEART RATE: 67 BPM | SYSTOLIC BLOOD PRESSURE: 101 MMHG | OXYGEN SATURATION: 95 % | WEIGHT: 187 LBS

## 2024-09-25 DIAGNOSIS — F06.8 PSYCHOSIS DUE TO PARKINSON'S DISEASE (HCC): ICD-10-CM

## 2024-09-25 DIAGNOSIS — M79.2 NEUROPATHIC PAIN: ICD-10-CM

## 2024-09-25 DIAGNOSIS — M54.50 CHRONIC BILATERAL LOW BACK PAIN WITHOUT SCIATICA: ICD-10-CM

## 2024-09-25 DIAGNOSIS — G20.A1 PARKINSON'S DISEASE WITHOUT DYSKINESIA OR FLUCTUATING MANIFESTATIONS (HCC): Primary | ICD-10-CM

## 2024-09-25 DIAGNOSIS — G89.29 CHRONIC BILATERAL LOW BACK PAIN WITHOUT SCIATICA: ICD-10-CM

## 2024-09-25 DIAGNOSIS — G20.A1 PSYCHOSIS DUE TO PARKINSON'S DISEASE (HCC): ICD-10-CM

## 2024-09-25 DIAGNOSIS — R53.82 CHRONIC FATIGUE, UNSPECIFIED: ICD-10-CM

## 2024-09-25 DIAGNOSIS — Z74.09 IMPAIRED FUNCTIONAL MOBILITY, BALANCE, GAIT, AND ENDURANCE: ICD-10-CM

## 2024-09-25 DIAGNOSIS — F02.B2 MODERATE DEMENTIA DUE TO PARKINSON'S DISEASE, WITH PSYCHOTIC DISTURBANCE (HCC): ICD-10-CM

## 2024-09-25 DIAGNOSIS — R29.898 WEAKNESS OF BOTH LOWER EXTREMITIES: ICD-10-CM

## 2024-09-25 DIAGNOSIS — F51.01 PRIMARY INSOMNIA: ICD-10-CM

## 2024-09-25 DIAGNOSIS — G20.A1 MODERATE DEMENTIA DUE TO PARKINSON'S DISEASE, WITH PSYCHOTIC DISTURBANCE (HCC): ICD-10-CM

## 2024-09-25 LAB
ALBUMIN SERPL-MCNC: 3.8 G/DL (ref 3.2–4.6)
ALBUMIN/GLOB SERPL: 1.4 (ref 1–1.9)
ALP SERPL-CCNC: 85 U/L (ref 40–129)
ALT SERPL-CCNC: 10 U/L (ref 8–55)
ANION GAP SERPL CALC-SCNC: 11 MMOL/L (ref 9–18)
AST SERPL-CCNC: 17 U/L (ref 15–37)
BASOPHILS # BLD: 0.1 K/UL (ref 0–0.2)
BASOPHILS NFR BLD: 1 % (ref 0–2)
BILIRUB SERPL-MCNC: 0.3 MG/DL (ref 0–1.2)
BUN SERPL-MCNC: 17 MG/DL (ref 8–23)
CALCIUM SERPL-MCNC: 9.5 MG/DL (ref 8.8–10.2)
CHLORIDE SERPL-SCNC: 100 MMOL/L (ref 98–107)
CK SERPL-CCNC: 25 U/L (ref 21–215)
CO2 SERPL-SCNC: 25 MMOL/L (ref 20–28)
CREAT SERPL-MCNC: 0.97 MG/DL (ref 0.8–1.3)
DIFFERENTIAL METHOD BLD: ABNORMAL
EOSINOPHIL # BLD: 0.4 K/UL (ref 0–0.8)
EOSINOPHIL NFR BLD: 4 % (ref 0.5–7.8)
ERYTHROCYTE [DISTWIDTH] IN BLOOD BY AUTOMATED COUNT: 12.5 % (ref 11.9–14.6)
ERYTHROCYTE [SEDIMENTATION RATE] IN BLOOD: 16 MM/HR
GLOBULIN SER CALC-MCNC: 2.8 G/DL (ref 2.3–3.5)
GLUCOSE SERPL-MCNC: 112 MG/DL (ref 70–99)
HCT VFR BLD AUTO: 47.8 % (ref 41.1–50.3)
HGB BLD-MCNC: 14.9 G/DL (ref 13.6–17.2)
IMM GRANULOCYTES # BLD AUTO: 0.1 K/UL (ref 0–0.5)
IMM GRANULOCYTES NFR BLD AUTO: 1 % (ref 0–5)
LYMPHOCYTES # BLD: 2.3 K/UL (ref 0.5–4.6)
LYMPHOCYTES NFR BLD: 25 % (ref 13–44)
MCH RBC QN AUTO: 32.4 PG (ref 26.1–32.9)
MCHC RBC AUTO-ENTMCNC: 31.2 G/DL (ref 31.4–35)
MCV RBC AUTO: 103.9 FL (ref 82–102)
MONOCYTES # BLD: 0.8 K/UL (ref 0.1–1.3)
MONOCYTES NFR BLD: 9 % (ref 4–12)
NEUTS SEG # BLD: 5.8 K/UL (ref 1.7–8.2)
NEUTS SEG NFR BLD: 62 % (ref 43–78)
NRBC # BLD: 0 K/UL (ref 0–0.2)
PLATELET # BLD AUTO: 246 K/UL (ref 150–450)
PMV BLD AUTO: 10.5 FL (ref 9.4–12.3)
POTASSIUM SERPL-SCNC: 5 MMOL/L (ref 3.5–5.1)
PROT SERPL-MCNC: 6.5 G/DL (ref 6.3–8.2)
RBC # BLD AUTO: 4.6 M/UL (ref 4.23–5.6)
SODIUM SERPL-SCNC: 136 MMOL/L (ref 136–145)
TSH, 3RD GENERATION: 1.17 UIU/ML (ref 0.27–4.2)
VIT B12 SERPL-MCNC: 443 PG/ML (ref 193–986)
WBC # BLD AUTO: 9.4 K/UL (ref 4.3–11.1)

## 2024-09-25 PROCEDURE — 99215 OFFICE O/P EST HI 40 MIN: CPT | Performed by: PSYCHIATRY & NEUROLOGY

## 2024-09-25 PROCEDURE — 1036F TOBACCO NON-USER: CPT | Performed by: PSYCHIATRY & NEUROLOGY

## 2024-09-25 PROCEDURE — 1123F ACP DISCUSS/DSCN MKR DOCD: CPT | Performed by: PSYCHIATRY & NEUROLOGY

## 2024-09-25 PROCEDURE — 3074F SYST BP LT 130 MM HG: CPT | Performed by: PSYCHIATRY & NEUROLOGY

## 2024-09-25 PROCEDURE — G8427 DOCREV CUR MEDS BY ELIG CLIN: HCPCS | Performed by: PSYCHIATRY & NEUROLOGY

## 2024-09-25 PROCEDURE — 3078F DIAST BP <80 MM HG: CPT | Performed by: PSYCHIATRY & NEUROLOGY

## 2024-09-25 PROCEDURE — G8417 CALC BMI ABV UP PARAM F/U: HCPCS | Performed by: PSYCHIATRY & NEUROLOGY

## 2024-09-25 ASSESSMENT — ENCOUNTER SYMPTOMS
ABDOMINAL PAIN: 0
COUGH: 1
VOICE CHANGE: 1

## 2024-10-23 DIAGNOSIS — R73.03 PREDIABETES: ICD-10-CM

## 2024-10-23 RX ORDER — CALCIUM CITRATE/VITAMIN D3 200MG-6.25
TABLET ORAL
Qty: 200 STRIP | Refills: 3 | Status: SHIPPED | OUTPATIENT
Start: 2024-10-23

## 2024-11-15 NOTE — PROGRESS NOTES
[facility] CARDIOLOGY  [address] [place] [address], [address]  [place] [address]  PHONE: [phone]        NAME:  Nestor Nagy  : 1945  MRN: [id number]     PCP:  Constantine Motley MD      SUBJECTIVE:   Nestor Nagy is a 79 y.o. male seen for a follow up visit regarding the following:     Chief Complaint   Patient presents with    Congestive Heart Failure    Atrial Fibrillation    Coronary Artery Disease       HPI:    Doing well since last visit without interval angina, CHF, palpitations, edema, presyncope or syncope with stable mild severity exertional dyspnea with mildly depressed EF s/p CABG/IMI.  Nuclear stress test in 2019 showed chronic inferolateral scar but no ischemia with ejection fraction 41% which correlates well with prior echocardiography.  Staying active at home but not much exercise other than walking around.  Not really dieting either.  Lipids stable as noted below.  Compliant with medications.  Taking Sinemet with new diagnosis of Parkinson's Dz but improving, neurology recently decreased lisinopril to 5mg daily.        Echocardiogram 2024:  Left Ventricle Mildly reduced left ventricular systolic function with a visually estimated EF of 40 - 45%. Left ventricle size is normal. Normal wall thickness. There is thinning, echogenicity, and severe hypokinesis to akinesis of the mid to distal inferolateral and inferoapical walls consistent with prior infarction.  There is no apical thrombus using Definity. Indeterminate diastolic function.   Left Atrium Left atrium is mildly dilated. LA Vol Index is  35 ml/m2.   Right Ventricle Right ventricle size is normal. Normal systolic function.   Right Atrium Right atrium size is normal.   Aortic Valve Valve structure is normal. Mild regurgitation with a centrally directed jet. No stenosis.   Mitral Valve Valve structure is normal. Mild regurgitation. No stenosis noted.   Tricuspid Valve Valve structure is normal.

## 2024-11-19 ENCOUNTER — OFFICE VISIT (OUTPATIENT)
Age: 79
End: 2024-11-19
Payer: MEDICARE

## 2024-11-19 VITALS
HEIGHT: 72 IN | HEART RATE: 63 BPM | DIASTOLIC BLOOD PRESSURE: 69 MMHG | BODY MASS INDEX: 24.79 KG/M2 | WEIGHT: 183 LBS | SYSTOLIC BLOOD PRESSURE: 137 MMHG

## 2024-11-19 DIAGNOSIS — R73.03 PREDIABETES: ICD-10-CM

## 2024-11-19 DIAGNOSIS — I50.22 CHRONIC SYSTOLIC HF (HEART FAILURE) (HCC): Primary | ICD-10-CM

## 2024-11-19 DIAGNOSIS — I10 ESSENTIAL HYPERTENSION, BENIGN: ICD-10-CM

## 2024-11-19 DIAGNOSIS — Z95.1 S/P CABG (CORONARY ARTERY BYPASS GRAFT): ICD-10-CM

## 2024-11-19 DIAGNOSIS — E78.5 DYSLIPIDEMIA: ICD-10-CM

## 2024-11-19 PROCEDURE — 1159F MED LIST DOCD IN RCRD: CPT | Performed by: INTERNAL MEDICINE

## 2024-11-19 PROCEDURE — 93000 ELECTROCARDIOGRAM COMPLETE: CPT | Performed by: INTERNAL MEDICINE

## 2024-11-19 PROCEDURE — 3078F DIAST BP <80 MM HG: CPT | Performed by: INTERNAL MEDICINE

## 2024-11-19 PROCEDURE — 1126F AMNT PAIN NOTED NONE PRSNT: CPT | Performed by: INTERNAL MEDICINE

## 2024-11-19 PROCEDURE — 1123F ACP DISCUSS/DSCN MKR DOCD: CPT | Performed by: INTERNAL MEDICINE

## 2024-11-19 PROCEDURE — 99214 OFFICE O/P EST MOD 30 MIN: CPT | Performed by: INTERNAL MEDICINE

## 2024-11-19 PROCEDURE — 3075F SYST BP GE 130 - 139MM HG: CPT | Performed by: INTERNAL MEDICINE

## 2024-11-19 PROCEDURE — 1036F TOBACCO NON-USER: CPT | Performed by: INTERNAL MEDICINE

## 2024-11-19 PROCEDURE — G8427 DOCREV CUR MEDS BY ELIG CLIN: HCPCS | Performed by: INTERNAL MEDICINE

## 2024-11-19 PROCEDURE — G8484 FLU IMMUNIZE NO ADMIN: HCPCS | Performed by: INTERNAL MEDICINE

## 2024-11-19 PROCEDURE — G8420 CALC BMI NORM PARAMETERS: HCPCS | Performed by: INTERNAL MEDICINE

## 2024-11-19 PROCEDURE — 1160F RVW MEDS BY RX/DR IN RCRD: CPT | Performed by: INTERNAL MEDICINE

## 2024-11-19 RX ORDER — SIMVASTATIN 10 MG
10 TABLET ORAL NIGHTLY
Qty: 90 TABLET | Refills: 3 | Status: SHIPPED | OUTPATIENT
Start: 2024-11-19

## 2024-11-19 RX ORDER — CARVEDILOL 6.25 MG/1
6.25 TABLET ORAL 2 TIMES DAILY
Qty: 180 TABLET | Refills: 3 | Status: SHIPPED | OUTPATIENT
Start: 2024-11-19

## 2024-11-19 RX ORDER — LISINOPRIL 5 MG/1
5 TABLET ORAL DAILY
Qty: 90 TABLET | Refills: 3 | Status: SHIPPED | OUTPATIENT
Start: 2024-11-19

## 2025-03-23 SDOH — ECONOMIC STABILITY: INCOME INSECURITY: IN THE LAST 12 MONTHS, WAS THERE A TIME WHEN YOU WERE NOT ABLE TO PAY THE MORTGAGE OR RENT ON TIME?: NO

## 2025-03-23 SDOH — ECONOMIC STABILITY: FOOD INSECURITY: WITHIN THE PAST 12 MONTHS, THE FOOD YOU BOUGHT JUST DIDN'T LAST AND YOU DIDN'T HAVE MONEY TO GET MORE.: NEVER TRUE

## 2025-03-23 SDOH — HEALTH STABILITY: PHYSICAL HEALTH: ON AVERAGE, HOW MANY MINUTES DO YOU ENGAGE IN EXERCISE AT THIS LEVEL?: 0 MIN

## 2025-03-23 SDOH — ECONOMIC STABILITY: FOOD INSECURITY: WITHIN THE PAST 12 MONTHS, YOU WORRIED THAT YOUR FOOD WOULD RUN OUT BEFORE YOU GOT MONEY TO BUY MORE.: NEVER TRUE

## 2025-03-23 SDOH — HEALTH STABILITY: PHYSICAL HEALTH: ON AVERAGE, HOW MANY DAYS PER WEEK DO YOU ENGAGE IN MODERATE TO STRENUOUS EXERCISE (LIKE A BRISK WALK)?: 0 DAYS

## 2025-03-23 SDOH — ECONOMIC STABILITY: TRANSPORTATION INSECURITY
IN THE PAST 12 MONTHS, HAS THE LACK OF TRANSPORTATION KEPT YOU FROM MEDICAL APPOINTMENTS OR FROM GETTING MEDICATIONS?: NO

## 2025-03-23 ASSESSMENT — PATIENT HEALTH QUESTIONNAIRE - PHQ9
SUM OF ALL RESPONSES TO PHQ QUESTIONS 1-9: 14
7. TROUBLE CONCENTRATING ON THINGS, SUCH AS READING THE NEWSPAPER OR WATCHING TELEVISION: SEVERAL DAYS
10. IF YOU CHECKED OFF ANY PROBLEMS, HOW DIFFICULT HAVE THESE PROBLEMS MADE IT FOR YOU TO DO YOUR WORK, TAKE CARE OF THINGS AT HOME, OR GET ALONG WITH OTHER PEOPLE: VERY DIFFICULT
8. MOVING OR SPEAKING SO SLOWLY THAT OTHER PEOPLE COULD HAVE NOTICED. OR THE OPPOSITE, BEING SO FIGETY OR RESTLESS THAT YOU HAVE BEEN MOVING AROUND A LOT MORE THAN USUAL: SEVERAL DAYS
4. FEELING TIRED OR HAVING LITTLE ENERGY: NEARLY EVERY DAY
1. LITTLE INTEREST OR PLEASURE IN DOING THINGS: NEARLY EVERY DAY
5. POOR APPETITE OR OVEREATING: SEVERAL DAYS
SUM OF ALL RESPONSES TO PHQ QUESTIONS 1-9: 14
2. FEELING DOWN, DEPRESSED OR HOPELESS: SEVERAL DAYS
SUM OF ALL RESPONSES TO PHQ QUESTIONS 1-9: 14
6. FEELING BAD ABOUT YOURSELF - OR THAT YOU ARE A FAILURE OR HAVE LET YOURSELF OR YOUR FAMILY DOWN: SEVERAL DAYS
3. TROUBLE FALLING OR STAYING ASLEEP: NEARLY EVERY DAY
SUM OF ALL RESPONSES TO PHQ QUESTIONS 1-9: 14
9. THOUGHTS THAT YOU WOULD BE BETTER OFF DEAD, OR OF HURTING YOURSELF: NOT AT ALL

## 2025-03-23 ASSESSMENT — LIFESTYLE VARIABLES
HOW MANY STANDARD DRINKS CONTAINING ALCOHOL DO YOU HAVE ON A TYPICAL DAY: PATIENT DOES NOT DRINK
HOW OFTEN DO YOU HAVE A DRINK CONTAINING ALCOHOL: 1
HOW OFTEN DO YOU HAVE A DRINK CONTAINING ALCOHOL: NEVER
HOW OFTEN DO YOU HAVE SIX OR MORE DRINKS ON ONE OCCASION: 1
HOW MANY STANDARD DRINKS CONTAINING ALCOHOL DO YOU HAVE ON A TYPICAL DAY: 0

## 2025-03-26 ENCOUNTER — OFFICE VISIT (OUTPATIENT)
Dept: INTERNAL MEDICINE CLINIC | Facility: CLINIC | Age: 80
End: 2025-03-26

## 2025-03-26 VITALS
TEMPERATURE: 97 F | BODY MASS INDEX: 25.27 KG/M2 | WEIGHT: 186.6 LBS | OXYGEN SATURATION: 95 % | HEIGHT: 72 IN | RESPIRATION RATE: 16 BRPM | HEART RATE: 85 BPM | SYSTOLIC BLOOD PRESSURE: 128 MMHG | DIASTOLIC BLOOD PRESSURE: 66 MMHG

## 2025-03-26 DIAGNOSIS — G20.A1 MODERATE DEMENTIA DUE TO PARKINSON'S DISEASE, WITH PSYCHOTIC DISTURBANCE (HCC): ICD-10-CM

## 2025-03-26 DIAGNOSIS — G20.A1 PARKINSON'S DISEASE WITHOUT DYSKINESIA OR FLUCTUATING MANIFESTATIONS (HCC): ICD-10-CM

## 2025-03-26 DIAGNOSIS — N40.1 BENIGN PROSTATIC HYPERPLASIA WITH LOWER URINARY TRACT SYMPTOMS, SYMPTOM DETAILS UNSPECIFIED: ICD-10-CM

## 2025-03-26 DIAGNOSIS — E11.9 TYPE 2 DIABETES MELLITUS WITHOUT COMPLICATION, WITHOUT LONG-TERM CURRENT USE OF INSULIN: ICD-10-CM

## 2025-03-26 DIAGNOSIS — I50.22 CHRONIC SYSTOLIC HF (HEART FAILURE) (HCC): ICD-10-CM

## 2025-03-26 DIAGNOSIS — F02.B2 MODERATE DEMENTIA DUE TO PARKINSON'S DISEASE, WITH PSYCHOTIC DISTURBANCE (HCC): ICD-10-CM

## 2025-03-26 DIAGNOSIS — Z00.00 MEDICARE ANNUAL WELLNESS VISIT, SUBSEQUENT: ICD-10-CM

## 2025-03-26 DIAGNOSIS — G60.9 HEREDITARY AND IDIOPATHIC PERIPHERAL NEUROPATHY: ICD-10-CM

## 2025-03-26 DIAGNOSIS — E11.9 TYPE 2 DIABETES MELLITUS WITHOUT COMPLICATION, WITHOUT LONG-TERM CURRENT USE OF INSULIN: Primary | ICD-10-CM

## 2025-03-26 DIAGNOSIS — I10 ESSENTIAL HYPERTENSION, BENIGN: ICD-10-CM

## 2025-03-26 DIAGNOSIS — K40.91 UNILATERAL RECURRENT INGUINAL HERNIA WITHOUT OBSTRUCTION OR GANGRENE: ICD-10-CM

## 2025-03-26 DIAGNOSIS — F03.B2 MODERATE DEMENTIA WITH PSYCHOTIC DISTURBANCE, UNSPECIFIED DEMENTIA TYPE (HCC): ICD-10-CM

## 2025-03-26 LAB
ALT SERPL-CCNC: <5 U/L (ref 8–55)
ANION GAP SERPL CALC-SCNC: 10 MMOL/L (ref 7–16)
BUN SERPL-MCNC: 15 MG/DL (ref 8–23)
CALCIUM SERPL-MCNC: 9.6 MG/DL (ref 8.8–10.2)
CHLORIDE SERPL-SCNC: 103 MMOL/L (ref 98–107)
CHOLEST SERPL-MCNC: 176 MG/DL (ref 0–200)
CO2 SERPL-SCNC: 24 MMOL/L (ref 20–29)
CREAT SERPL-MCNC: 0.91 MG/DL (ref 0.8–1.3)
EST. AVERAGE GLUCOSE BLD GHB EST-MCNC: 121 MG/DL
GLUCOSE SERPL-MCNC: 168 MG/DL (ref 70–99)
HBA1C MFR BLD: 5.8 % (ref 0–5.6)
HDLC SERPL-MCNC: 49 MG/DL (ref 40–60)
HDLC SERPL: 3.6 (ref 0–5)
LDLC SERPL CALC-MCNC: 112 MG/DL (ref 0–100)
POTASSIUM SERPL-SCNC: 4.4 MMOL/L (ref 3.5–5.1)
PSA SERPL-MCNC: 10.8 NG/ML (ref 0–4)
SODIUM SERPL-SCNC: 137 MMOL/L (ref 136–145)
TRIGL SERPL-MCNC: 80 MG/DL (ref 0–150)
VLDLC SERPL CALC-MCNC: 16 MG/DL (ref 6–23)

## 2025-03-26 RX ORDER — METFORMIN HYDROCHLORIDE 500 MG/1
500 TABLET, EXTENDED RELEASE ORAL
Qty: 90 TABLET | Refills: 3 | Status: SHIPPED | OUTPATIENT
Start: 2025-03-26

## 2025-03-27 ENCOUNTER — RESULTS FOLLOW-UP (OUTPATIENT)
Dept: INTERNAL MEDICINE CLINIC | Facility: CLINIC | Age: 80
End: 2025-03-27

## 2025-03-27 PROBLEM — Z95.1 S/P CABG (CORONARY ARTERY BYPASS GRAFT): Status: RESOLVED | Noted: 2017-11-22 | Resolved: 2025-03-27

## 2025-03-27 PROBLEM — H61.23 BILATERAL IMPACTED CERUMEN: Status: RESOLVED | Noted: 2023-10-24 | Resolved: 2025-03-27

## 2025-03-27 PROBLEM — G30.9 ALZHEIMER'S DISEASE, UNSPECIFIED (CODE): Status: RESOLVED | Noted: 2023-06-06 | Resolved: 2025-03-27

## 2025-03-27 NOTE — PROGRESS NOTES
Screen:  Have you seen the dentist within the past year?: (!) (Patient-Rptd) No    Intervention:  Patient declines any further evaluation or treatment    Hearing Screen:  Do you or your family notice any trouble with your hearing that hasn't been managed with hearing aids?: (!) (Patient-Rptd) Yes    Interventions:  Patient declines any further evaluation or treatment      ADL's:   Patient reports needing help with:  Select all that apply: (!) (Patient-Rptd) Eating, Dressing, Grooming, Bathing, Walking/Balance  Select all that apply: (!) (Patient-Rptd) Laundry, Housekeeping, Banking/Finances, Shopping, Telephone Use, Food Preparation, Transportation, Taking Medications  Interventions:  Patient advised to follow up in the office for further evaluation and treatment    Advanced Directives:  Do you have a Living Will?: (!) (Patient-Rptd) No    Intervention:  has NO advanced directive - information provided                     Objective   Vitals:    03/26/25 1122   BP: 128/66   BP Site: Left Upper Arm   Patient Position: Sitting   BP Cuff Size: Large Adult   Pulse: 85   Resp: 16   Temp: 97 °F (36.1 °C)   TempSrc: Temporal   SpO2: 95%   Weight: 84.6 kg (186 lb 9.6 oz)   Height: 1.829 m (6')      Body mass index is 25.31 kg/m².                  No Known Allergies  Prior to Visit Medications    Medication Sig Taking? Authorizing Provider   metFORMIN (GLUCOPHAGE-XR) 500 MG extended release tablet Take 1 tablet by mouth daily (with breakfast) Yes Constantine Motley MD   simvastatin (ZOCOR) 10 MG tablet Take 1 tablet by mouth nightly Yes Earnest Agarwal MD   lisinopril (PRINIVIL;ZESTRIL) 5 MG tablet Take 1 tablet by mouth daily Yes Earnest Agarwal MD   carvedilol (COREG) 6.25 MG tablet Take 1 tablet by mouth 2 times daily Yes Earnest Agarwal MD   TRUE METRIX BLOOD GLUCOSE TEST strip TEST BLOOD SUGAR TWICE DAILY Yes Constantine Motley MD   TRUEplus Lancets 33G MISC TEST BLOOD SUGAR TWICE DAILY

## 2025-04-05 DIAGNOSIS — G20.A1 PARKINSON DISEASE (HCC): ICD-10-CM

## 2025-04-07 RX ORDER — CARBIDOPA/LEVODOPA 25MG-250MG
TABLET ORAL
Qty: 180 TABLET | Refills: 3 | Status: SHIPPED | OUTPATIENT
Start: 2025-04-07

## 2025-04-17 ENCOUNTER — OFFICE VISIT (OUTPATIENT)
Dept: SURGERY | Age: 80
End: 2025-04-17
Payer: MEDICARE

## 2025-04-17 VITALS
WEIGHT: 184.7 LBS | HEIGHT: 72 IN | BODY MASS INDEX: 25.02 KG/M2 | DIASTOLIC BLOOD PRESSURE: 77 MMHG | SYSTOLIC BLOOD PRESSURE: 133 MMHG | HEART RATE: 58 BPM

## 2025-04-17 DIAGNOSIS — K40.90 RIGHT INGUINAL HERNIA: Primary | ICD-10-CM

## 2025-04-17 PROCEDURE — 3075F SYST BP GE 130 - 139MM HG: CPT | Performed by: SURGERY

## 2025-04-17 PROCEDURE — G8417 CALC BMI ABV UP PARAM F/U: HCPCS | Performed by: SURGERY

## 2025-04-17 PROCEDURE — 1036F TOBACCO NON-USER: CPT | Performed by: SURGERY

## 2025-04-17 PROCEDURE — 99204 OFFICE O/P NEW MOD 45 MIN: CPT | Performed by: SURGERY

## 2025-04-17 PROCEDURE — 1159F MED LIST DOCD IN RCRD: CPT | Performed by: SURGERY

## 2025-04-17 PROCEDURE — 1123F ACP DISCUSS/DSCN MKR DOCD: CPT | Performed by: SURGERY

## 2025-04-17 PROCEDURE — G8427 DOCREV CUR MEDS BY ELIG CLIN: HCPCS | Performed by: SURGERY

## 2025-04-17 PROCEDURE — 1160F RVW MEDS BY RX/DR IN RCRD: CPT | Performed by: SURGERY

## 2025-04-17 PROCEDURE — 3078F DIAST BP <80 MM HG: CPT | Performed by: SURGERY

## 2025-04-17 NOTE — PROGRESS NOTES
Nestor Jenkins MD   General and Robotic surgery  [address] [place] [address]  Phone [phone]   Fax [phone]      Date of visit: 2025      Primary/Requesting provider: Constantine Motley MD         Name: Nestor Nagy      MRN: [id number]       : 1945       Age: 80 y.o.    Sex: male        PCP: Constantine Motley MD     CC:    Chief Complaint   Patient presents with    New Patient     NEW PATIENT - Hernia Ref. Tiesha         HPI:     Nestor Nagy is a 80 y.o. male with multiple comorbidities who reports a right inguinal hernia for many years.  Denies pain, incarceration, or previous repair.  He is mostly in a wheelchair.  No other new concerns today.        PMH:    Past Medical History:   Diagnosis Date    Acute MI, inferolateral wall, subsequent episode of care ([facility]) 2017    Benign neoplasm of colon     Body mass index 32.0-32.9, adult 04/15/2015    Coronary atherosclerosis of unspecified type of vessel, native or graft 04/15/2015    Cough     Diabetes mellitus ([facility]) 2022    Encounter for long-term (current) use of other medications     Essential hypertension, benign 04/15/2015    Impotence of organic origin     Inguinal hernia without mention of obstruction or gangrene, bilateral, (not specified as recurrent)     Injury to other specified cranial nerves     Malignant melanoma of skin of other and unspecified parts of face     S/P MOH's    Onychomycosis     Other abnormal glucose 04/15/2015    Pre-Diabetes    Palpitations     Plantar fascial fibromatosis     S/P CABG (coronary artery bypass graft) 2017    S/P coronary artery stent placement 2017    Sciatica     Type 2 diabetes mellitus without complication     Unspecified hereditary and idiopathic peripheral neuropathy 04/15/2015       PSH:    Past Surgical History:   Procedure Laterality Date    ANGIOPLASTY  2007    Coronary    CHOLECYSTECTOMY  2009

## 2025-05-14 ENCOUNTER — OFFICE VISIT (OUTPATIENT)
Dept: NEUROLOGY | Age: 80
End: 2025-05-14
Payer: MEDICARE

## 2025-05-14 VITALS
WEIGHT: 182 LBS | SYSTOLIC BLOOD PRESSURE: 131 MMHG | HEART RATE: 81 BPM | DIASTOLIC BLOOD PRESSURE: 78 MMHG | OXYGEN SATURATION: 97 % | BODY MASS INDEX: 24.68 KG/M2

## 2025-05-14 DIAGNOSIS — G20.A1 PARKINSON'S DISEASE WITHOUT DYSKINESIA OR FLUCTUATING MANIFESTATIONS (HCC): Primary | ICD-10-CM

## 2025-05-14 DIAGNOSIS — F02.B2 MODERATE DEMENTIA DUE TO PARKINSON'S DISEASE, WITH PSYCHOTIC DISTURBANCE (HCC): ICD-10-CM

## 2025-05-14 DIAGNOSIS — G20.A1 MODERATE DEMENTIA DUE TO PARKINSON'S DISEASE, WITH PSYCHOTIC DISTURBANCE (HCC): ICD-10-CM

## 2025-05-14 DIAGNOSIS — Z74.09 IMPAIRED FUNCTIONAL MOBILITY, BALANCE, GAIT, AND ENDURANCE: ICD-10-CM

## 2025-05-14 PROCEDURE — 3078F DIAST BP <80 MM HG: CPT | Performed by: PSYCHIATRY & NEUROLOGY

## 2025-05-14 PROCEDURE — 1036F TOBACCO NON-USER: CPT | Performed by: PSYCHIATRY & NEUROLOGY

## 2025-05-14 PROCEDURE — 3075F SYST BP GE 130 - 139MM HG: CPT | Performed by: PSYCHIATRY & NEUROLOGY

## 2025-05-14 PROCEDURE — 99215 OFFICE O/P EST HI 40 MIN: CPT | Performed by: PSYCHIATRY & NEUROLOGY

## 2025-05-14 PROCEDURE — G8420 CALC BMI NORM PARAMETERS: HCPCS | Performed by: PSYCHIATRY & NEUROLOGY

## 2025-05-14 PROCEDURE — 1123F ACP DISCUSS/DSCN MKR DOCD: CPT | Performed by: PSYCHIATRY & NEUROLOGY

## 2025-05-14 PROCEDURE — G8428 CUR MEDS NOT DOCUMENT: HCPCS | Performed by: PSYCHIATRY & NEUROLOGY

## 2025-05-14 RX ORDER — MEMANTINE HYDROCHLORIDE 5 MG/1
5 TABLET ORAL 2 TIMES DAILY
Qty: 180 TABLET | Refills: 3 | Status: SHIPPED | OUTPATIENT
Start: 2025-05-14

## 2025-05-14 RX ORDER — CARBIDOPA AND LEVODOPA 25; 100 MG/1; MG/1
1 TABLET ORAL 2 TIMES DAILY
Qty: 180 TABLET | Refills: 3 | Status: SHIPPED | OUTPATIENT
Start: 2025-05-14

## 2025-05-14 ASSESSMENT — ENCOUNTER SYMPTOMS
VOICE CHANGE: 1
COUGH: 1
ABDOMINAL PAIN: 0

## 2025-05-14 NOTE — PROGRESS NOTES
BEDTIME 180 tablet 3    metFORMIN (GLUCOPHAGE-XR) 500 MG extended release tablet Take 1 tablet by mouth daily (with breakfast) 90 tablet 3    simvastatin (ZOCOR) 10 MG tablet Take 1 tablet by mouth nightly 90 tablet 3    lisinopril (PRINIVIL;ZESTRIL) 5 MG tablet Take 1 tablet by mouth daily 90 tablet 3    carvedilol (COREG) 6.25 MG tablet Take 1 tablet by mouth 2 times daily 180 tablet 3    TRUE METRIX BLOOD GLUCOSE TEST strip TEST BLOOD SUGAR TWICE DAILY 200 strip 3    TRUEplus Lancets 33G MISC TEST BLOOD SUGAR TWICE DAILY 200 each 3    mirtazapine (REMERON) 7.5 MG tablet TAKE 1 TO 2 TABLETS EVERY NIGHT 180 tablet 3    Blood Glucose Monitoring Suppl (TRUE METRIX METER) LUIS FERNANDO Use to check blood sugars once daily 1 each 11    Echinacea 400 MG CAPS Take by mouth daily      vitamin C (ASCORBIC ACID) 500 MG tablet Take 2 tablets by mouth daily      Cholecalciferol (VITAMIN D3) 125 MCG (5000 UT) TABS Take 5,000 tablets by mouth every evening      zinc 50 MG TABS tablet Take 1 tablet by mouth daily      Elderberry 500 MG CAPS Take 1,250 mg by mouth daily      OIL OF OREGANO PO Take 50 mg by mouth daily      Coenzyme Q10 (CO Q 10) 100 MG CAPS Take by mouth every evening      Chlorpheniramine Maleate (CHLOR-TABLETS PO) Take by mouth as needed      aspirin 81 MG EC tablet Take 1 tablet by mouth daily      nitroGLYCERIN (NITROSTAT) 0.4 MG SL tablet Place 1 tablet under the tongue every 5 minutes as needed      [DISCONTINUED] memantine (NAMENDA) 5 MG tablet Take 1 tablet by mouth 2 times daily 180 tablet 3    Alpha-Lipoic Acid 300 MG CAPS Take by mouth daily (Patient not taking: Reported on 5/14/2025)       No facility-administered encounter medications on file as of 5/14/2025.     ALLERGIES:  No Known Allergies      Physical Exam:     /78 (BP Site: Left Upper Arm, Patient Position: Sitting)   Pulse 81   Wt 82.6 kg (182 lb)   SpO2 97%   BMI 24.68 kg/m²     General Exam:  General: Elderly appearing male in no apparent

## 2025-05-24 PROBLEM — Z95.1 HX OF CABG: Status: ACTIVE | Noted: 2017-11-22

## 2025-05-24 NOTE — PROGRESS NOTES
2 TABLETS EVERY NIGHT 180 tablet 3    Blood Glucose Monitoring Suppl (TRUE METRIX METER) LUIS FERNANDO Use to check blood sugars once daily 1 each 11    Echinacea 400 MG CAPS Take by mouth daily      vitamin C (ASCORBIC ACID) 500 MG tablet Take 2 tablets by mouth daily      Cholecalciferol (VITAMIN D3) 125 MCG (5000 UT) TABS Take 5,000 tablets by mouth every evening      zinc 50 MG TABS tablet Take 1 tablet by mouth daily      Elderberry 500 MG CAPS Take 1,250 mg by mouth daily      OIL OF OREGANO PO Take 50 mg by mouth daily      Coenzyme Q10 (CO Q 10) 100 MG CAPS Take by mouth every evening      Chlorpheniramine Maleate (CHLOR-TABLETS PO) Take by mouth as needed      aspirin 81 MG EC tablet Take 1 tablet by mouth daily      nitroGLYCERIN (NITROSTAT) 0.4 MG SL tablet Place 1 tablet under the tongue every 5 minutes as needed       No current facility-administered medications for this visit.            No Known Allergies    Patient Active Problem List    Diagnosis Date Noted    Parkinson's disease ([facility]) 10/24/2023     Priority: Low    Other general symptoms and signs 10/24/2023     Priority: Low    Type 2 diabetes mellitus without complication, without long-term current use of insulin ([facility]) 10/24/2023     Priority: Low    Chronic fatigue 10/24/2023     Priority: Low    Onychomycosis 10/24/2023     Priority: Low    Benign prostatic hyperplasia with lower urinary tract symptoms 10/24/2023     Priority: Low    Hearing loss 10/24/2023     Priority: Low    Moderate dementia with psychotic disturbance, unspecified dementia type ([facility]) 05/14/2023     Priority: Low    Dyslipidemia 11/22/2017     Priority: Low    Hx of CABG 11/22/2017     Priority: Low    Encounter for long-term (current) drug use      Priority: Low    Chronic systolic HF (heart failure) ([facility]) 12/07/2015     Priority: Low     Overview Note:     EF 40%, lateral hypokinesis        Body mass index 32.0-32.9, adult 04/15/2015     Priority: Low    Hereditary and idiopathic

## 2025-05-27 ENCOUNTER — OFFICE VISIT (OUTPATIENT)
Age: 80
End: 2025-05-27
Payer: MEDICARE

## 2025-05-27 VITALS
BODY MASS INDEX: 24.52 KG/M2 | WEIGHT: 181 LBS | HEIGHT: 72 IN | HEART RATE: 60 BPM | SYSTOLIC BLOOD PRESSURE: 118 MMHG | DIASTOLIC BLOOD PRESSURE: 62 MMHG

## 2025-05-27 DIAGNOSIS — E11.9 TYPE 2 DIABETES MELLITUS WITHOUT COMPLICATION, WITHOUT LONG-TERM CURRENT USE OF INSULIN (HCC): ICD-10-CM

## 2025-05-27 DIAGNOSIS — Z95.1 HX OF CABG: ICD-10-CM

## 2025-05-27 DIAGNOSIS — I10 ESSENTIAL HYPERTENSION, BENIGN: Primary | ICD-10-CM

## 2025-05-27 DIAGNOSIS — E78.5 DYSLIPIDEMIA: ICD-10-CM

## 2025-05-27 DIAGNOSIS — I50.22 CHRONIC SYSTOLIC HF (HEART FAILURE) (HCC): ICD-10-CM

## 2025-05-27 PROCEDURE — 3078F DIAST BP <80 MM HG: CPT | Performed by: INTERNAL MEDICINE

## 2025-05-27 PROCEDURE — 1123F ACP DISCUSS/DSCN MKR DOCD: CPT | Performed by: INTERNAL MEDICINE

## 2025-05-27 PROCEDURE — 1160F RVW MEDS BY RX/DR IN RCRD: CPT | Performed by: INTERNAL MEDICINE

## 2025-05-27 PROCEDURE — 1159F MED LIST DOCD IN RCRD: CPT | Performed by: INTERNAL MEDICINE

## 2025-05-27 PROCEDURE — 1126F AMNT PAIN NOTED NONE PRSNT: CPT | Performed by: INTERNAL MEDICINE

## 2025-05-27 PROCEDURE — 99214 OFFICE O/P EST MOD 30 MIN: CPT | Performed by: INTERNAL MEDICINE

## 2025-05-27 PROCEDURE — G8420 CALC BMI NORM PARAMETERS: HCPCS | Performed by: INTERNAL MEDICINE

## 2025-05-27 PROCEDURE — 3044F HG A1C LEVEL LT 7.0%: CPT | Performed by: INTERNAL MEDICINE

## 2025-05-27 PROCEDURE — G8427 DOCREV CUR MEDS BY ELIG CLIN: HCPCS | Performed by: INTERNAL MEDICINE

## 2025-05-27 PROCEDURE — 1036F TOBACCO NON-USER: CPT | Performed by: INTERNAL MEDICINE

## 2025-05-27 PROCEDURE — 3074F SYST BP LT 130 MM HG: CPT | Performed by: INTERNAL MEDICINE

## 2025-05-27 RX ORDER — CARVEDILOL 6.25 MG/1
6.25 TABLET ORAL 2 TIMES DAILY
Qty: 180 TABLET | Refills: 3 | Status: SHIPPED | OUTPATIENT
Start: 2025-05-27

## 2025-05-27 RX ORDER — SIMVASTATIN 10 MG
10 TABLET ORAL NIGHTLY
Qty: 90 TABLET | Refills: 3 | Status: SHIPPED | OUTPATIENT
Start: 2025-05-27

## 2025-05-27 RX ORDER — LISINOPRIL 5 MG/1
5 TABLET ORAL DAILY
Qty: 90 TABLET | Refills: 3 | Status: SHIPPED | OUTPATIENT
Start: 2025-05-27

## 2025-06-04 ENCOUNTER — CLINICAL DOCUMENTATION (OUTPATIENT)
Dept: NEUROLOGY | Age: 80
End: 2025-06-04

## 2025-06-04 NOTE — PROGRESS NOTES
We were contacted from [facility] that the patient himself missed a visit on 5 - 30 - 25 and they were unable to communicate with the patient.

## 2025-06-19 DIAGNOSIS — F51.01 PRIMARY INSOMNIA: ICD-10-CM

## 2025-06-19 RX ORDER — MIRTAZAPINE 7.5 MG/1
TABLET, FILM COATED ORAL
Qty: 180 TABLET | Refills: 3 | Status: SHIPPED | OUTPATIENT
Start: 2025-06-19

## 2025-07-09 DIAGNOSIS — R73.03 PREDIABETES: ICD-10-CM

## 2025-07-09 RX ORDER — GLUCOSAM/CHON-MSM1/C/MANG/BOSW 500-416.6
TABLET ORAL
Qty: 200 EACH | Refills: 3 | Status: SHIPPED | OUTPATIENT
Start: 2025-07-09

## 2025-08-01 ENCOUNTER — OFFICE VISIT (OUTPATIENT)
Dept: INTERNAL MEDICINE CLINIC | Facility: CLINIC | Age: 80
End: 2025-08-01
Payer: MEDICARE

## 2025-08-01 VITALS
RESPIRATION RATE: 16 BRPM | DIASTOLIC BLOOD PRESSURE: 65 MMHG | WEIGHT: 178.2 LBS | HEART RATE: 61 BPM | BODY MASS INDEX: 24.14 KG/M2 | HEIGHT: 72 IN | TEMPERATURE: 97.2 F | SYSTOLIC BLOOD PRESSURE: 129 MMHG | OXYGEN SATURATION: 97 %

## 2025-08-01 DIAGNOSIS — I50.22 CHRONIC SYSTOLIC HF (HEART FAILURE) (HCC): ICD-10-CM

## 2025-08-01 DIAGNOSIS — E11.9 TYPE 2 DIABETES MELLITUS WITHOUT COMPLICATION, WITHOUT LONG-TERM CURRENT USE OF INSULIN (HCC): ICD-10-CM

## 2025-08-01 DIAGNOSIS — E78.5 DYSLIPIDEMIA: ICD-10-CM

## 2025-08-01 DIAGNOSIS — R53.82 CHRONIC FATIGUE: ICD-10-CM

## 2025-08-01 DIAGNOSIS — G20.A1 PARKINSON'S DISEASE WITHOUT DYSKINESIA OR FLUCTUATING MANIFESTATIONS (HCC): ICD-10-CM

## 2025-08-01 DIAGNOSIS — G60.9 HEREDITARY AND IDIOPATHIC PERIPHERAL NEUROPATHY: ICD-10-CM

## 2025-08-01 DIAGNOSIS — I10 ESSENTIAL HYPERTENSION, BENIGN: Primary | ICD-10-CM

## 2025-08-01 DIAGNOSIS — F03.B2 MODERATE DEMENTIA WITH PSYCHOTIC DISTURBANCE, UNSPECIFIED DEMENTIA TYPE (HCC): ICD-10-CM

## 2025-08-01 DIAGNOSIS — N40.1 BENIGN PROSTATIC HYPERPLASIA WITH LOWER URINARY TRACT SYMPTOMS, SYMPTOM DETAILS UNSPECIFIED: ICD-10-CM

## 2025-08-01 PROBLEM — Z95.1 HX OF CABG: Status: RESOLVED | Noted: 2017-11-22 | Resolved: 2025-08-01

## 2025-08-01 LAB
ALBUMIN SERPL-MCNC: 3.9 G/DL (ref 3.2–4.6)
ALBUMIN/GLOB SERPL: 1.3 (ref 1–1.9)
ALP SERPL-CCNC: 71 U/L (ref 40–129)
ALT SERPL-CCNC: <5 U/L (ref 8–55)
ANION GAP SERPL CALC-SCNC: 12 MMOL/L (ref 7–16)
AST SERPL-CCNC: 18 U/L (ref 15–37)
BASOPHILS # BLD: 0.04 K/UL (ref 0–0.2)
BASOPHILS NFR BLD: 0.4 % (ref 0–2)
BILIRUB SERPL-MCNC: 0.4 MG/DL (ref 0–1.2)
BUN SERPL-MCNC: 16 MG/DL (ref 8–23)
CALCIUM SERPL-MCNC: 10 MG/DL (ref 8.8–10.2)
CHLORIDE SERPL-SCNC: 101 MMOL/L (ref 98–107)
CHOLEST SERPL-MCNC: 190 MG/DL (ref 0–200)
CO2 SERPL-SCNC: 24 MMOL/L (ref 20–29)
CREAT SERPL-MCNC: 1.08 MG/DL (ref 0.8–1.3)
DIFFERENTIAL METHOD BLD: ABNORMAL
EOSINOPHIL # BLD: 0.34 K/UL (ref 0–0.8)
EOSINOPHIL NFR BLD: 3.1 % (ref 0.5–7.8)
ERYTHROCYTE [DISTWIDTH] IN BLOOD BY AUTOMATED COUNT: 12.7 % (ref 11.9–14.6)
EST. AVERAGE GLUCOSE BLD GHB EST-MCNC: 124 MG/DL
GLOBULIN SER CALC-MCNC: 3 G/DL (ref 2.3–3.5)
GLUCOSE SERPL-MCNC: 111 MG/DL (ref 70–99)
HBA1C MFR BLD: 6 % (ref 0–5.6)
HCT VFR BLD AUTO: 46.9 % (ref 41.1–50.3)
HDLC SERPL-MCNC: 47 MG/DL (ref 40–60)
HDLC SERPL: 4.1 (ref 0–5)
HGB BLD-MCNC: 15.1 G/DL (ref 13.6–17.2)
IMM GRANULOCYTES # BLD AUTO: 0.05 K/UL (ref 0–0.5)
IMM GRANULOCYTES NFR BLD AUTO: 0.5 % (ref 0–5)
LDLC SERPL CALC-MCNC: 108 MG/DL (ref 0–100)
LYMPHOCYTES # BLD: 2.29 K/UL (ref 0.5–4.6)
LYMPHOCYTES NFR BLD: 20.8 % (ref 13–44)
MCH RBC QN AUTO: 33.8 PG (ref 26.1–32.9)
MCHC RBC AUTO-ENTMCNC: 32.2 G/DL (ref 31.4–35)
MCV RBC AUTO: 104.9 FL (ref 82–102)
MONOCYTES # BLD: 1.04 K/UL (ref 0.1–1.3)
MONOCYTES NFR BLD: 9.5 % (ref 4–12)
NEUTS SEG # BLD: 7.23 K/UL (ref 1.7–8.2)
NEUTS SEG NFR BLD: 65.7 % (ref 43–78)
NRBC # BLD: 0 K/UL (ref 0–0.2)
PLATELET # BLD AUTO: 221 K/UL (ref 150–450)
PMV BLD AUTO: 10.6 FL (ref 9.4–12.3)
POTASSIUM SERPL-SCNC: 4.5 MMOL/L (ref 3.5–5.1)
PROT SERPL-MCNC: 6.9 G/DL (ref 6.3–8.2)
PSA SERPL-MCNC: 13.1 NG/ML (ref 0–4)
RBC # BLD AUTO: 4.47 M/UL (ref 4.23–5.6)
SODIUM SERPL-SCNC: 138 MMOL/L (ref 136–145)
TRIGL SERPL-MCNC: 177 MG/DL (ref 0–150)
TSH, 3RD GENERATION: 1.76 UIU/ML (ref 0.27–4.2)
VLDLC SERPL CALC-MCNC: 35 MG/DL (ref 6–23)
WBC # BLD AUTO: 11 K/UL (ref 4.3–11.1)

## 2025-08-01 PROCEDURE — G2211 COMPLEX E/M VISIT ADD ON: HCPCS | Performed by: INTERNAL MEDICINE

## 2025-08-01 PROCEDURE — 99214 OFFICE O/P EST MOD 30 MIN: CPT | Performed by: INTERNAL MEDICINE

## 2025-08-01 PROCEDURE — 1036F TOBACCO NON-USER: CPT | Performed by: INTERNAL MEDICINE

## 2025-08-01 PROCEDURE — 3044F HG A1C LEVEL LT 7.0%: CPT | Performed by: INTERNAL MEDICINE

## 2025-08-01 PROCEDURE — 1123F ACP DISCUSS/DSCN MKR DOCD: CPT | Performed by: INTERNAL MEDICINE

## 2025-08-01 PROCEDURE — 1160F RVW MEDS BY RX/DR IN RCRD: CPT | Performed by: INTERNAL MEDICINE

## 2025-08-01 PROCEDURE — 3074F SYST BP LT 130 MM HG: CPT | Performed by: INTERNAL MEDICINE

## 2025-08-01 PROCEDURE — G8420 CALC BMI NORM PARAMETERS: HCPCS | Performed by: INTERNAL MEDICINE

## 2025-08-01 PROCEDURE — G8427 DOCREV CUR MEDS BY ELIG CLIN: HCPCS | Performed by: INTERNAL MEDICINE

## 2025-08-01 PROCEDURE — 3078F DIAST BP <80 MM HG: CPT | Performed by: INTERNAL MEDICINE

## 2025-08-01 PROCEDURE — 1126F AMNT PAIN NOTED NONE PRSNT: CPT | Performed by: INTERNAL MEDICINE

## 2025-08-01 PROCEDURE — 1159F MED LIST DOCD IN RCRD: CPT | Performed by: INTERNAL MEDICINE

## 2025-08-01 NOTE — PROGRESS NOTES
8/1/2025 12:57 PM  Location:[facility] PHYSICIAN SERVICES  [facility] INTERNAL MEDICINE  [place]  Patient #:  [id number]  YOB: 1945      History of Present Illness  The patient presents for evaluation of Parkinson's disease, urinary urgency, memory issues, weight loss, and hernia.    Parkinson's Disease  Parkinson's symptoms have remained stable with slight improvement. No worsening reported, but slight increase in left hand tremors. Several near-falls prevented by . Completed 2-month physical therapy program 1.5 weeks ago, improving mobility with cane and stairs. Neurologist follow-up every 4 months, next in mid-September 2025. Carbidopa-levodopa dosage increased to 25/100 mg.  - Onset: Symptoms stable with slight improvement.  - Location: Left hand tremors.  - Duration: Completed 2-month physical therapy program 1.5 weeks ago.  - Character: Slight increase in left hand tremors, several near-falls.  - Alleviating/Aggravating Factors: Physical therapy improved mobility; Carbidopa-levodopa dosage increased.  - Timing: Neurologist follow-up every 4 months.  - Severity: No worsening reported, slight improvement.    Urinary Urgency  Urinary urgency has slightly worsened since last visit, often struggling to reach bathroom in time. Currently taking saw palmetto.  - Onset: Slightly worsened since last visit.  - Character: Often struggling to reach bathroom in time.  - Alleviating/Aggravating Factors: Currently taking saw palmetto.    Memory Issues  Memory has declined since last visit.  - Onset: Declined since last visit.    Weight Loss  Appetite remains good, occasional early satiety. Weight loss primarily muscle mass. Exercises to strengthen muscles have improved ability to get in and out of bed.  - Character: Weight loss primarily muscle mass.  - Alleviating/Aggravating Factors: Exercises to strengthen muscles.    Hernia  Consulted Dr. Jenkins regarding hernia; surgery not recommended due to

## 2025-08-11 DIAGNOSIS — R73.03 PREDIABETES: ICD-10-CM

## 2025-08-11 RX ORDER — CALCIUM CITRATE/VITAMIN D3 200MG-6.25
TABLET ORAL
Qty: 200 STRIP | Refills: 3 | Status: SHIPPED | OUTPATIENT
Start: 2025-08-11